# Patient Record
Sex: MALE | Race: OTHER | HISPANIC OR LATINO
[De-identification: names, ages, dates, MRNs, and addresses within clinical notes are randomized per-mention and may not be internally consistent; named-entity substitution may affect disease eponyms.]

---

## 2017-08-02 RX ORDER — AZITHROMYCIN 500 MG/1
0 TABLET, FILM COATED ORAL
Qty: 6 | Refills: 0 | COMMUNITY
Start: 2017-08-02

## 2017-08-03 ENCOUNTER — TRANSCRIPTION ENCOUNTER (OUTPATIENT)
Age: 37
End: 2017-08-03

## 2017-08-03 ENCOUNTER — INPATIENT (INPATIENT)
Facility: HOSPITAL | Age: 37
LOS: 0 days | Discharge: HOME | DRG: 203 | End: 2017-08-04
Attending: HOSPITALIST | Admitting: INTERNAL MEDICINE
Payer: MEDICAID

## 2017-08-03 VITALS
HEART RATE: 96 BPM | OXYGEN SATURATION: 95 % | TEMPERATURE: 99 F | RESPIRATION RATE: 28 BRPM | DIASTOLIC BLOOD PRESSURE: 67 MMHG | SYSTOLIC BLOOD PRESSURE: 132 MMHG

## 2017-08-03 DIAGNOSIS — Z98.89 OTHER SPECIFIED POSTPROCEDURAL STATES: Chronic | ICD-10-CM

## 2017-08-03 DIAGNOSIS — J45.909 UNSPECIFIED ASTHMA, UNCOMPLICATED: ICD-10-CM

## 2017-08-03 LAB
ALBUMIN SERPL ELPH-MCNC: 4.7 G/DL — SIGNIFICANT CHANGE UP (ref 3.3–5)
ALP SERPL-CCNC: 83 U/L — SIGNIFICANT CHANGE UP (ref 40–120)
ALT FLD-CCNC: 67 U/L RC — HIGH (ref 10–45)
ANION GAP SERPL CALC-SCNC: 13 MMOL/L — SIGNIFICANT CHANGE UP (ref 5–17)
AST SERPL-CCNC: 114 U/L — HIGH (ref 10–40)
BASOPHILS # BLD AUTO: 0 K/UL — SIGNIFICANT CHANGE UP (ref 0–0.2)
BASOPHILS NFR BLD AUTO: 0.1 % — SIGNIFICANT CHANGE UP (ref 0–2)
BILIRUB SERPL-MCNC: 0.5 MG/DL — SIGNIFICANT CHANGE UP (ref 0.2–1.2)
BUN SERPL-MCNC: 12 MG/DL — SIGNIFICANT CHANGE UP (ref 7–23)
CALCIUM SERPL-MCNC: 9.3 MG/DL — SIGNIFICANT CHANGE UP (ref 8.4–10.5)
CHLORIDE SERPL-SCNC: 103 MMOL/L — SIGNIFICANT CHANGE UP (ref 96–108)
CO2 SERPL-SCNC: 25 MMOL/L — SIGNIFICANT CHANGE UP (ref 22–31)
CREAT SERPL-MCNC: 0.75 MG/DL — SIGNIFICANT CHANGE UP (ref 0.5–1.3)
EOSINOPHIL # BLD AUTO: 0.1 K/UL — SIGNIFICANT CHANGE UP (ref 0–0.5)
EOSINOPHIL NFR BLD AUTO: 0.8 % — SIGNIFICANT CHANGE UP (ref 0–6)
GAS PNL BLDV: SIGNIFICANT CHANGE UP
GLUCOSE SERPL-MCNC: 117 MG/DL — HIGH (ref 70–99)
HCT VFR BLD CALC: 43.2 % — SIGNIFICANT CHANGE UP (ref 39–50)
HGB BLD-MCNC: 14.8 G/DL — SIGNIFICANT CHANGE UP (ref 13–17)
LYMPHOCYTES # BLD AUTO: 0.7 K/UL — LOW (ref 1–3.3)
LYMPHOCYTES # BLD AUTO: 4.9 % — LOW (ref 13–44)
MAGNESIUM SERPL-MCNC: 1.8 MG/DL — SIGNIFICANT CHANGE UP (ref 1.6–2.6)
MCHC RBC-ENTMCNC: 30.8 PG — SIGNIFICANT CHANGE UP (ref 27–34)
MCHC RBC-ENTMCNC: 34.2 GM/DL — SIGNIFICANT CHANGE UP (ref 32–36)
MCV RBC AUTO: 90.2 FL — SIGNIFICANT CHANGE UP (ref 80–100)
MONOCYTES # BLD AUTO: 0.4 K/UL — SIGNIFICANT CHANGE UP (ref 0–0.9)
MONOCYTES NFR BLD AUTO: 2.4 % — SIGNIFICANT CHANGE UP (ref 2–14)
NEUTROPHILS # BLD AUTO: 13.4 K/UL — HIGH (ref 1.8–7.4)
NEUTROPHILS NFR BLD AUTO: 91.7 % — HIGH (ref 43–77)
PLATELET # BLD AUTO: 280 K/UL — SIGNIFICANT CHANGE UP (ref 150–400)
POTASSIUM SERPL-MCNC: 4.2 MMOL/L — SIGNIFICANT CHANGE UP (ref 3.5–5.3)
POTASSIUM SERPL-SCNC: 4.2 MMOL/L — SIGNIFICANT CHANGE UP (ref 3.5–5.3)
PROT SERPL-MCNC: 8.2 G/DL — SIGNIFICANT CHANGE UP (ref 6–8.3)
RAPID RVP RESULT: DETECTED
RBC # BLD: 4.79 M/UL — SIGNIFICANT CHANGE UP (ref 4.2–5.8)
RBC # FLD: 11.4 % — SIGNIFICANT CHANGE UP (ref 10.3–14.5)
RV+EV RNA SPEC QL NAA+PROBE: DETECTED
SODIUM SERPL-SCNC: 141 MMOL/L — SIGNIFICANT CHANGE UP (ref 135–145)
WBC # BLD: 14.6 K/UL — HIGH (ref 3.8–10.5)
WBC # FLD AUTO: 14.6 K/UL — HIGH (ref 3.8–10.5)

## 2017-08-03 PROCEDURE — 71010: CPT | Mod: 26

## 2017-08-03 PROCEDURE — 99285 EMERGENCY DEPT VISIT HI MDM: CPT

## 2017-08-03 RX ORDER — IPRATROPIUM/ALBUTEROL SULFATE 18-103MCG
3 AEROSOL WITH ADAPTER (GRAM) INHALATION ONCE
Qty: 0 | Refills: 0 | Status: COMPLETED | OUTPATIENT
Start: 2017-08-03 | End: 2017-08-03

## 2017-08-03 RX ORDER — MAGNESIUM SULFATE 500 MG/ML
2 VIAL (ML) INJECTION ONCE
Qty: 0 | Refills: 0 | Status: COMPLETED | OUTPATIENT
Start: 2017-08-03 | End: 2017-08-03

## 2017-08-03 RX ORDER — SODIUM CHLORIDE 9 MG/ML
1000 INJECTION INTRAMUSCULAR; INTRAVENOUS; SUBCUTANEOUS ONCE
Qty: 0 | Refills: 0 | Status: COMPLETED | OUTPATIENT
Start: 2017-08-03 | End: 2017-08-03

## 2017-08-03 RX ORDER — IPRATROPIUM/ALBUTEROL SULFATE 18-103MCG
0 AEROSOL WITH ADAPTER (GRAM) INHALATION
Qty: 90 | Refills: 0 | COMMUNITY
Start: 2017-08-03

## 2017-08-03 RX ORDER — IPRATROPIUM/ALBUTEROL SULFATE 18-103MCG
3 AEROSOL WITH ADAPTER (GRAM) INHALATION EVERY 4 HOURS
Qty: 0 | Refills: 0 | Status: DISCONTINUED | OUTPATIENT
Start: 2017-08-03 | End: 2017-08-04

## 2017-08-03 RX ORDER — BROMPHENIRAMINE MALEATE, PSEUDOEPHEDRINE HYDROCHLORIDE, AND DEXTROMETHORPHAN HYDROBROMIDE 2; 10; 30 MG/5ML; MG/5ML; MG/5ML
0 SOLUTION ORAL
Qty: 200 | Refills: 0 | COMMUNITY
Start: 2017-08-03

## 2017-08-03 RX ADMIN — Medication 3 MILLILITER(S): at 20:09

## 2017-08-03 RX ADMIN — Medication 50 GRAM(S): at 20:09

## 2017-08-03 RX ADMIN — SODIUM CHLORIDE 1000 MILLILITER(S): 9 INJECTION INTRAMUSCULAR; INTRAVENOUS; SUBCUTANEOUS at 20:10

## 2017-08-03 RX ADMIN — Medication 3 MILLILITER(S): at 22:00

## 2017-08-03 RX ADMIN — Medication 125 MILLIGRAM(S): at 20:09

## 2017-08-03 NOTE — ED PROVIDER NOTE - PHYSICAL EXAMINATION
Jean: A & O x 3, speaking in 2 word sentences, in respiratory distress, HEENT WNL and no facial asymmetry; lungs tight with poor air movement, heart with reg rhythm; abdomen soft obese NTND; extremities with no edema; skin with no rashes, neuro exam non focal with no motor or sensory deficits

## 2017-08-03 NOTE — ED PROVIDER NOTE - OBJECTIVE STATEMENT
36 year old asthmatic, never intubated but states he was very close in the past, presenting after days of worsening symptoms, on day to of azithromycin, prendisone, nebs 36 year old asthmatic, never intubated but states he was very close in the past, presenting after days of worsening symptoms, on day to of azithromycin, prendisone, nebs.     PMD: Dylan Zavala ([facility])

## 2017-08-03 NOTE — ED ADULT NURSE NOTE - OBJECTIVE STATEMENT
Recvd pt in respiratory distress.  pt was tachypneic and diaphoretic; unable to speak more than 2 words sentences at a time.  wheezes ascultated in all lung fields while pt is tripoding to breathe.  pt immediately given duoneb tx and 18 g heplock inserted.  iv meds administered, as per md's orders.  bipap tx initiated, as per md's orders with settings ps:12/5 fio2=40%.  pt  now breathing more comfortably.  no diaphoresis or tachypnea noted.  pt is awake, alert and responsive to all stimuli.  pt denies any pain at this time.  pt resting in bed with cardiac monitor in place in ST awaiting md reeval.  will continue to monitor.

## 2017-08-03 NOTE — ED ADULT TRIAGE NOTE - CHIEF COMPLAINT QUOTE
Asthma attack, sent by urgent care for further treatment, given Duoneb, oral prednisone and Zithromax  Last time in ED received Solumedrol and Mag

## 2017-08-03 NOTE — ED PROVIDER NOTE - MEDICAL DECISION MAKING DETAILS
Asthmatic with history of near intubation in the past, presenting with respiratory distress refractory to home prednisone, nebs, azithromycin. Bipap started immediately with iniatition of magnesium, solumedrol, duonebs. Will require at least observation in CDU if he resolves or admission for continued treatment. Asthmatic with history of near intubation in the past, presenting with respiratory distress refractory to home prednisone, nebs, azithromycin. Bipap started immediately with iniatition of magnesium, solumedrol, duonebs. Will require at least observation in CDU if he resolves or admission for continued treatment ZR

## 2017-08-04 ENCOUNTER — TRANSCRIPTION ENCOUNTER (OUTPATIENT)
Age: 37
End: 2017-08-04

## 2017-08-04 VITALS
SYSTOLIC BLOOD PRESSURE: 130 MMHG | OXYGEN SATURATION: 95 % | DIASTOLIC BLOOD PRESSURE: 63 MMHG | TEMPERATURE: 98 F | HEART RATE: 107 BPM | RESPIRATION RATE: 16 BRPM

## 2017-08-04 DIAGNOSIS — R74.0 NONSPECIFIC ELEVATION OF LEVELS OF TRANSAMINASE AND LACTIC ACID DEHYDROGENASE [LDH]: ICD-10-CM

## 2017-08-04 DIAGNOSIS — B34.1 ENTEROVIRUS INFECTION, UNSPECIFIED: ICD-10-CM

## 2017-08-04 DIAGNOSIS — J45.901 UNSPECIFIED ASTHMA WITH (ACUTE) EXACERBATION: ICD-10-CM

## 2017-08-04 DIAGNOSIS — Z29.9 ENCOUNTER FOR PROPHYLACTIC MEASURES, UNSPECIFIED: ICD-10-CM

## 2017-08-04 LAB — GAS PNL BLDA: SIGNIFICANT CHANGE UP

## 2017-08-04 PROCEDURE — 80053 COMPREHEN METABOLIC PANEL: CPT

## 2017-08-04 PROCEDURE — 85027 COMPLETE CBC AUTOMATED: CPT

## 2017-08-04 PROCEDURE — 99223 1ST HOSP IP/OBS HIGH 75: CPT | Mod: GC

## 2017-08-04 PROCEDURE — 36600 WITHDRAWAL OF ARTERIAL BLOOD: CPT

## 2017-08-04 PROCEDURE — 87798 DETECT AGENT NOS DNA AMP: CPT

## 2017-08-04 PROCEDURE — 96374 THER/PROPH/DIAG INJ IV PUSH: CPT

## 2017-08-04 PROCEDURE — 84132 ASSAY OF SERUM POTASSIUM: CPT

## 2017-08-04 PROCEDURE — 83605 ASSAY OF LACTIC ACID: CPT

## 2017-08-04 PROCEDURE — 82803 BLOOD GASES ANY COMBINATION: CPT

## 2017-08-04 PROCEDURE — 99239 HOSP IP/OBS DSCHRG MGMT >30: CPT

## 2017-08-04 PROCEDURE — 82330 ASSAY OF CALCIUM: CPT

## 2017-08-04 PROCEDURE — 82435 ASSAY OF BLOOD CHLORIDE: CPT

## 2017-08-04 PROCEDURE — 71045 X-RAY EXAM CHEST 1 VIEW: CPT

## 2017-08-04 PROCEDURE — 83735 ASSAY OF MAGNESIUM: CPT

## 2017-08-04 PROCEDURE — 87581 M.PNEUMON DNA AMP PROBE: CPT

## 2017-08-04 PROCEDURE — 94640 AIRWAY INHALATION TREATMENT: CPT

## 2017-08-04 PROCEDURE — 93005 ELECTROCARDIOGRAM TRACING: CPT

## 2017-08-04 PROCEDURE — 99285 EMERGENCY DEPT VISIT HI MDM: CPT | Mod: 25

## 2017-08-04 PROCEDURE — 85014 HEMATOCRIT: CPT

## 2017-08-04 PROCEDURE — 87633 RESP VIRUS 12-25 TARGETS: CPT

## 2017-08-04 PROCEDURE — 94660 CPAP INITIATION&MGMT: CPT

## 2017-08-04 PROCEDURE — 82947 ASSAY GLUCOSE BLOOD QUANT: CPT

## 2017-08-04 PROCEDURE — 87486 CHLMYD PNEUM DNA AMP PROBE: CPT

## 2017-08-04 PROCEDURE — 96375 TX/PRO/DX INJ NEW DRUG ADDON: CPT

## 2017-08-04 PROCEDURE — 84295 ASSAY OF SERUM SODIUM: CPT

## 2017-08-04 RX ORDER — IPRATROPIUM/ALBUTEROL SULFATE 18-103MCG
3 AEROSOL WITH ADAPTER (GRAM) INHALATION
Qty: 0 | Refills: 0 | COMMUNITY
Start: 2017-08-04

## 2017-08-04 RX ORDER — ACETAMINOPHEN 500 MG
650 TABLET ORAL ONCE
Qty: 0 | Refills: 0 | Status: COMPLETED | OUTPATIENT
Start: 2017-08-04 | End: 2017-08-04

## 2017-08-04 RX ORDER — IPRATROPIUM/ALBUTEROL SULFATE 18-103MCG
3 AEROSOL WITH ADAPTER (GRAM) INHALATION
Qty: 1 | Refills: 0 | OUTPATIENT
Start: 2017-08-04 | End: 2017-08-11

## 2017-08-04 RX ORDER — IPRATROPIUM/ALBUTEROL SULFATE 18-103MCG
3 AEROSOL WITH ADAPTER (GRAM) INHALATION
Qty: 0 | Refills: 0 | DISCHARGE
Start: 2017-08-04

## 2017-08-04 RX ORDER — ALBUTEROL 90 UG/1
2 AEROSOL, METERED ORAL
Qty: 0 | Refills: 0 | DISCHARGE
Start: 2017-08-04

## 2017-08-04 RX ORDER — GUAIFENESIN/DEXTROMETHORPHAN 600MG-30MG
5 TABLET, EXTENDED RELEASE 12 HR ORAL
Qty: 100 | Refills: 0 | OUTPATIENT
Start: 2017-08-04 | End: 2017-08-09

## 2017-08-04 RX ORDER — ALBUTEROL 90 UG/1
2 AEROSOL, METERED ORAL
Qty: 1 | Refills: 0
Start: 2017-08-04

## 2017-08-04 RX ORDER — GUAIFENESIN/DEXTROMETHORPHAN 600MG-30MG
5 TABLET, EXTENDED RELEASE 12 HR ORAL
Qty: 100 | Refills: 0
Start: 2017-08-04 | End: 2017-08-09

## 2017-08-04 RX ORDER — ALBUTEROL 90 UG/1
2 AEROSOL, METERED ORAL
Qty: 1 | Refills: 0 | OUTPATIENT
Start: 2017-08-04

## 2017-08-04 RX ORDER — ALBUTEROL 90 UG/1
2 AEROSOL, METERED ORAL
Qty: 0 | Refills: 0 | COMMUNITY

## 2017-08-04 RX ADMIN — Medication 650 MILLIGRAM(S): at 13:06

## 2017-08-04 RX ADMIN — Medication 3 MILLILITER(S): at 02:09

## 2017-08-04 RX ADMIN — Medication 3 MILLILITER(S): at 15:59

## 2017-08-04 RX ADMIN — Medication 100 MILLIGRAM(S): at 05:41

## 2017-08-04 RX ADMIN — Medication 50 MILLIGRAM(S): at 06:46

## 2017-08-04 RX ADMIN — Medication 650 MILLIGRAM(S): at 14:00

## 2017-08-04 RX ADMIN — Medication 3 MILLILITER(S): at 05:41

## 2017-08-04 RX ADMIN — Medication 3 MILLILITER(S): at 12:02

## 2017-08-04 RX ADMIN — Medication 200 MILLIGRAM(S): at 16:00

## 2017-08-04 NOTE — DISCHARGE NOTE ADULT - CARE PLAN
Principal Discharge DX:	Asthma exacerbation  Goal:	Take your medications, follow up with your doctor  Instructions for follow-up, activity and diet:	You were admitted to the hospital for an asthma exacerbation likely secondary to a viral upper respiratory tract infection. You were treated with nebulizers and high dose steroids. You improved clinically and now will be discharged home on 3 additional days of prednisone, a rescue inhaler, nebulizers. We recommend you follow up with your primary care doctor within the week. If you experience difficulty breathing, chest pain, worsening symptoms please call your doctor or return to the emergency room.  Secondary Diagnosis:	Enterovirus infection  Goal:	symptom management  Instructions for follow-up, activity and diet:	Supportive care, wash your hands, maintain good hygiene. Recommending rest and take in plenty of fluids. We have sent you a cough suppressant to your pharmacy.

## 2017-08-04 NOTE — DISCHARGE NOTE ADULT - MEDICATION SUMMARY - MEDICATIONS TO STOP TAKING
I will STOP taking the medications listed below when I get home from the hospital:    albuterol  --  by mouth    Stiolto Respimat  --  inhaled    predniSONE 50 mg oral tablet  -- 1 tab(s) by mouth once a day  -- It is very important that you take or use this exactly as directed.  Do not skip doses or discontinue unless directed by your doctor.  Obtain medical advice before taking any non-prescription drugs as some may affect the action of this medication.  Take with food or milk.    Hydromet 5 mg-1.5 mg/5 mL oral syrup  -- 5 milliliter(s) by mouth every 8 hours, As Needed - for cough  -- Caution federal law prohibits the transfer of this drug to any person other  than the person for whom it was prescribed.  May cause drowsiness.  Alcohol may intensify this effect.  Use care when operating dangerous machinery.  Obtain medical advice before taking any non-prescription drugs as some may affect the action of this medication.    clarithromycin 250 mg oral tablet  -- 2 tab(s) by mouth 2 times a day  -- Finish all this medication unless otherwise directed by prescriber.    Xopenex HFA 45 mcg/inh inhalation aerosol  -- 1 puff(s) inhaled every 4 hours PRN wheezing and cough  -- For inhalation only.  Shake well before use.    Hydromet 1.5 mg-5 mg/5 mL oral syrup  -- 5 milliliter(s) by mouth every 8 hours, As Needed MDD:15ml  -- Caution federal law prohibits the transfer of this drug to any person other  than the person for whom it was prescribed.  May cause drowsiness.  Alcohol may intensify this effect.  Use care when operating dangerous machinery.  Obtain medical advice before taking any non-prescription drugs as some may affect the action of this medication.    predniSONE 50 mg oral tablet  -- 1 tab(s) by mouth once a day  -- It is very important that you take or use this exactly as directed.  Do not skip doses or discontinue unless directed by your doctor.  Obtain medical advice before taking any non-prescription drugs as some may affect the action of this medication.  Take with food or milk.    Xopenex HFA CFC free 45 mcg/inh inhalation aerosol  -- 2 puff(s) inhaled every 4 hours, As Needed  -- For inhalation only.  Shake well before use.    benzonatate 200 mg oral capsule  -- 1 cap(s) by mouth 3 times a day  -- May cause drowsiness.  Alcohol may intensify this effect.  Use care when operating dangerous machinery.  Swallow whole.  Do not crush.    Xopenex Concentrate 1.25 mg/0.5 mL inhalation solution  -- 0.5 milliliter(s) inhaled 3 times a day  -- For inhalation only.  It is very important that you take or use this exactly as directed.  Do not skip doses or discontinue unless directed by your doctor.  Obtain medical advice before taking any non-prescription drugs as some may affect the action of this medication.    predniSONE 50 mg oral tablet  -- 1 tab(s) by mouth once a day    albuterol-ipratropium 2.5 mg-0.5 mg/3 mL inhalation solution  -- 3 milliliter(s) inhaled every 4 hours

## 2017-08-04 NOTE — H&P ADULT - PROBLEM SELECTOR PLAN 1
Wheezing on admission with Leukocytosis (received Prednisone outpatient x one day). Improved IV solumedrol   Duonebs prn. No need for antibiotics Wheezing on admission with Leukocytosis (received Prednisone outpatient x one day). Improved with IV solumedrol  c/w Prednisone 50 mg qd for now    Duonebs prn. No need for antibiotics Wheezing on admission with Leukocytosis (received Prednisone outpatient x one day). Improved with IV solumedrol  c/w Prednisone 50 mg qd for now    Duonebs   No need for antibiotics as CXR with no notable consolidations at this time.

## 2017-08-04 NOTE — H&P ADULT - PROBLEM SELECTOR PLAN 3
DVT ppx: Lovenox Likely in setting of acute Viral URI  Trend Hepatic Panel  F/U with outpatient provider

## 2017-08-04 NOTE — H&P ADULT - ATTENDING COMMENTS
Pleasant 35 yo man with PMH of asthma presenting with shortness of breath. Patient states SOB acutely worsened in the past day with associated wheezing. [facility] and Was given Prednisone, Zithromax and Albuterol/Ipratropium nebulizer which made him feel somewhat improved after an hour and worsened today. Patient was then advised by urgent came for further treatment. Patient states that his fiancé was a sick contact. Endorses cough in the past few days with productive quality and yellow green sputum prior to worsening symptoms. Patient states that he has not been on maintenance medication for asthma since January as his symptoms typically exacerbate in setting of URI. He typically utilizes an albuterol inhaler as needed. Patient s/p Duoneb, Solumedrol and Magnesium and BiPAP in the ED and currently states he feels improved.  Vitals reviewed and physically examined patient. Agree with resident's findings: Pertinent findings: Patient is sitting in bed, no apparent distress. Speaking in full sentences. Pulm: no labored breathing, no use of accessory muscles, good air movement b/l. no appreciable wheeze at this time. no rales nor ronchi. CV: RRR + S1 S2 no appreciable murmur. Skin warm and dry.  Labs, imaging and EKG personally reviewed EKG.  Asthma exacerbation: patient with improvement of symptoms. Continue with ATC nebulizer, Steroids, antitussives prn. Supplemental O2 prn  Primary day team to eval resp status during day in setting of recent ABG as SpO2 91.   Remainder of plan as annotated above.  Patient previously unknown to me and I was assigned to case with resident. Primary day team to continue care in AM. Pleasant 35 yo man with PMH of asthma presenting with shortness of breath. Patient states SOB acutely worsened in the past day with associated wheezing. Patient [facility] and Was given Prednisone, Zithromax and Albuterol/Ipratropium nebulizer which made him feel somewhat improved after an hour and worsened today. Patient was then advised by urgent came for further treatment. Patient states that his fiancé was a sick contact. Endorses cough in the past few days with productive quality and yellow green sputum prior to worsening symptoms. Patient states that he has not been on maintenance medication for asthma since January as his symptoms typically exacerbate in setting of URI. He typically utilizes an albuterol inhaler as needed. Patient s/p Duoneb, Solumedrol and Magnesium and BiPAP in the ED and currently states he feels improved.  Vitals reviewed and physically examined patient. Agree with resident's findings: Pertinent findings: Patient is sitting in bed, no apparent distress. Speaking in full sentences. Pulm: no labored breathing, no use of accessory muscles, good air movement b/l. no appreciable wheeze at this time. no rales nor ronchi. CV: RRR + S1 S2 no appreciable murmur. Skin warm and dry.  Labs, imaging and EKG personally reviewed EKG.  Asthma exacerbation: patient with improvement of symptoms. Continue with ATC nebulizer, Steroids, antitussives prn. Supplemental O2 prn  Primary day team to eval resp status during day in setting of recent ABG as SpO2 91. Trend lactate  Remainder of plan as annotated above.  Patient previously unknown to me and I was assigned to case with resident. Primary day team to continue care in AM.

## 2017-08-04 NOTE — H&P ADULT - NSHPREVIEWOFSYSTEMS_GEN_ALL_CORE
Review of Systems: GEN: no fevers, Positive chills, no night sweats no weight loss , no swollen lymph nodes    EYES: No blurry vision , no changes in vision  ENT: no sores, no runny nose, no sore throat   PULM: as per HPI   CARD: no chest pain, no palpitations    GI : no abdominal pain , no nausea, no vomiting  : no burning urination, no change in color of urine, no flank pain, no blood in urine   MSK: no swelling   SKIN: no bruising or bleeding, no sores in mouth  , no yellowing of the skin  Neuro: no lightheadedness, no headaches, no weakness, no fainting, no confusion , no seizures

## 2017-08-04 NOTE — DISCHARGE NOTE ADULT - HOSPITAL COURSE
md 37 yo man with PMH of asthma presenting with shortness of breath. Patient stateed SOB acutely worsened in the past day with associated wheezing. Patient [facility] and Was given Prednisone, Zithromax and Albuterol/Ipratropium nebulizer which made him feel somewhat improved after an hour but worsened subsequently. Patient was then advised by urgent came for further treatment. admitted for asthma exacerbation. Treated with Duonebs, Solumedrol and Magnesium and BiPAP in the ED and subsequently improved.   Found to be enterovirus positive. Likely viral URI causing the asthma exacerbation.  On the floor pt was treated with ATC nebulizer, Steroids, antitussives prn. Supplemental O2 prn    He subsequently improved, was medically stable for discharge home with outpatient follow up. He was discharged with 3 days of oral prednisone, albuterol hfa, nebulizers for home

## 2017-08-04 NOTE — H&P ADULT - HISTORY OF PRESENT ILLNESS
Patient is a 36 years old male with PMH of mild intermittent Asthma (never intubated) who presents with SOB of one day in duration. Patient says that his fiance has the Flu and he developed a productive cough with body aches a day ago. His cough was productive of yellowish sputum. He also endorsed having chills and anorexia but denied chest pain, palpitations, fever, nausea, vomiting, sore throat, abdominal pain, diarrhea, rash, or urinary complaints. He took a codeine based cough suppressant which helped alleviate his symptoms to a certain extent. He used his albuterol without significant relief.     In the ED, patient was hemodynamically stable but tachypneic. Placed on BiPAP, given IV solumedrol and admitted to medicine. Patient is a 36 years old male with PMH of mild intermittent Asthma (never intubated) who presents with SOB of one day in duration. Patient says that his fiance has the Flu and he developed a productive cough with body aches a day ago. His cough was productive of yellowish sputum. He also endorsed having chills and anorexia but denied chest pain, palpitations, fever, nausea, vomiting, sore throat, abdominal pain, diarrhea, rash, or urinary complaints. He took a codeine based cough suppressant which helped alleviate his symptoms to a certain extent. He used his albuterol without significant relief.     In the ED, patient was hemodynamically stable but tachypneic. Placed on BiPAP, given IV solumedrol, Duonebs, IV Mg and admitted to medicine. Patient is a 36 years old male with PMH of mild intermittent Asthma (never intubated) who presents with SOB of one day in duration. Patient says that his fiance has the Flu and he developed a productive cough with body aches a day ago. His cough was productive of yellowish sputum. He also endorsed having chills and anorexia but denied chest pain, palpitations, fever, nausea, vomiting, sore throat, abdominal pain, diarrhea, rash, or urinary complaints. He took a codeine based cough suppressant which helped alleviate his symptoms to a certain extent. He used his albuterol without significant relief. He sought medical attention from his PMD who prescribed Prednisone 50 mg qd and Z pack. Patient took 2 days of z pack and one day of prednisone without significant relief.     In the ED, patient was hemodynamically stable but tachypneic. Placed on BiPAP, given IV solumedrol, Duonebs, IV Mg and admitted to medicine.

## 2017-08-04 NOTE — CHART NOTE - NSCHARTNOTEFT_GEN_A_CORE
hospitalist brief note. See H&P dated today for full details.   Pt seen and examined at bedside with NP Lia  35 yo M with mild intermittent asthma p/w asthma exacerbation 2/2 entero/rhinovirus. Clinically improved today, off NC with 95% saturation on RA  cont supportive care for viral URI - cough supressants, nasal decongestant as needed  for asthma- cont duonebs, prednisone.   pt doing clinically well, if continues to improve  suspect discharge later today vs tomorrow   will follow up    pager [phone]

## 2017-08-04 NOTE — DISCHARGE NOTE ADULT - MEDICATION SUMMARY - MEDICATIONS TO TAKE
I will START or STAY ON the medications listed below when I get home from the hospital:    predniSONE 50 mg oral tablet  -- 1 tab(s) by mouth once a day  -- Indication: For Asthma    albuterol-ipratropium 2.5 mg-0.5 mg/3 mL inhalation solution  -- 3 milliliter(s) inhaled every 4 hours  -- Indication: For Asthma    Ventolin HFA 90 mcg/inh inhalation aerosol  -- 2 puff(s) inhaled 4 times a day, As Needed  -- Indication: For Asthma    guaifenesin-dextromethorphan 100mg-10mg/5 mL oral liquid  -- 5 milliliter(s) by mouth every 6 hours  -- Medication should be taken with plenty of water.    -- Indication: For Enterovirus infection

## 2017-08-04 NOTE — H&P ADULT - NSHPPHYSICALEXAM_GEN_ALL_CORE
Vital Signs Last 24 Hrs  T(C): 37.2 (04 Aug 2017 01:27), Max: 37.4 (03 Aug 2017 19:44)  T(F): 98.9 (04 Aug 2017 01:27), Max: 99.3 (03 Aug 2017 19:44)  HR: 89 (04 Aug 2017 01:27) (84 - 98)  BP: 141/79 (04 Aug 2017 01:27) (132/67 - 146/90)  BP(mean): --  RR: 18 (04 Aug 2017 01:27) (18 - 28)  SpO2: 96% (04 Aug 2017 01:27) (95% - 99%)    PHYSICAL EXAM:  GENERAL: NAD, well-developed  HEAD:  Atraumatic, Normocephalic  EYES: EOMI, PERRLA, conjunctiva and sclera clear  NECK: Supple, No JVD  CHEST/LUNG: Clear to auscultation bilaterally; No wheeze  HEART: Regular rate and rhythm; No murmurs, rubs, or gallops  ABDOMEN: Soft, Nontender, Nondistended; Bowel sounds present  EXTREMITIES:  2+ Peripheral Pulses, No clubbing, cyanosis, or edema  PSYCH: AAOx3  NEUROLOGY: non-focal  SKIN: No rashes or lesions

## 2017-08-04 NOTE — H&P ADULT - ASSESSMENT
Patient is a 36 years old male with PMH of mild intermittent Asthma (never intubated) who presents with SOB of one day in duration

## 2017-08-04 NOTE — DISCHARGE NOTE ADULT - PLAN OF CARE
Take your medications, follow up with your doctor You were admitted to the hospital for an asthma exacerbation likely secondary to a viral upper respiratory tract infection. You were treated with nebulizers and high dose steroids. You improved clinically and now will be discharged home on 3 additional days of prednisone, a rescue inhaler, nebulizers. We recommend you follow up with your primary care doctor within the week. If you experience difficulty breathing, chest pain, worsening symptoms please call your doctor or return to the emergency room. symptom management Supportive care, wash your hands, maintain good hygiene. Recommending rest and take in plenty of fluids. We have sent you a cough suppressant to your pharmacy.

## 2017-08-04 NOTE — H&P ADULT - NSHPLABSRESULTS_GEN_ALL_CORE
14.8   14.6  )-----------( 280      ( 03 Aug 2017 20:28 )             43.2       08-03    141  |  103  |  12  ----------------------------<  117<H>  4.2   |  25  |  0.75    Ca    9.3      03 Aug 2017 20:28  Mg     1.8     08-03    TPro  8.2  /  Alb  4.7  /  TBili  0.5  /  DBili  x   /  AST  114<H>  /  ALT  67<H>  /  AlkPhos  83  08-03    RVP: + for entero/Rhinovirus     CXR: No focal consolidation noted. No pleural effusions. Personally reviewed labs and noted in detail below               14.8   14.6  )-----------( 280      ( 03 Aug 2017 20:28 )             43.2     08-03    141  |  103  |  12  ----------------------------<  117<H>  4.2   |  25  |  0.75    Ca    9.3      03 Aug 2017 20:28  Mg     1.8     08-03    TPro  8.2  /  Alb  4.7  /  TBili  0.5  /  DBili  x   /  AST  114<H>  /  ALT  67<H>  /  AlkPhos  83  08-03     ABG - ( 04 Aug 2017 05:20 )  pH: 7.41  /  pCO2: 40    /  pO2: 58    / HCO3: 25    / Base Excess: .5    /  SaO2: 91          RVP: + for entero/Rhinovirus     Personally reviewed CXR: No focal consolidation noted. No pleural effusions.    Personally reviewed EKG:  SR 97 bpm  QTc 424 no appreciable ST segment changes

## 2017-08-04 NOTE — H&P ADULT - NSHPSOCIALHISTORY_GEN_ALL_CORE
Occasional smoker. Social drinking   Independent with all ADLs Engaged:  Occasional smoker.   Social drinking   Independent with all ADLs

## 2017-08-04 NOTE — H&P ADULT - PROBLEM SELECTOR PLAN 2
In setting of acute Viral URI  Will need repeat LFTs in a month Supportive measure  Antipyretics prn   Antitussives prn

## 2018-02-07 ENCOUNTER — TRANSCRIPTION ENCOUNTER (OUTPATIENT)
Age: 38
End: 2018-02-07

## 2018-04-01 ENCOUNTER — OUTPATIENT (OUTPATIENT)
Dept: OUTPATIENT SERVICES | Facility: HOSPITAL | Age: 38
LOS: 1 days | End: 2018-04-01
Payer: MEDICAID

## 2018-04-01 DIAGNOSIS — Z98.89 OTHER SPECIFIED POSTPROCEDURAL STATES: Chronic | ICD-10-CM

## 2018-04-01 PROCEDURE — G9001: CPT

## 2018-04-17 DIAGNOSIS — R69 ILLNESS, UNSPECIFIED: ICD-10-CM

## 2018-05-06 ENCOUNTER — TRANSCRIPTION ENCOUNTER (OUTPATIENT)
Age: 38
End: 2018-05-06

## 2018-07-02 ENCOUNTER — EMERGENCY (EMERGENCY)
Facility: HOSPITAL | Age: 38
LOS: 1 days | Discharge: HOME | End: 2018-07-02
Attending: EMERGENCY MEDICINE
Payer: MEDICAID

## 2018-07-02 VITALS
SYSTOLIC BLOOD PRESSURE: 138 MMHG | DIASTOLIC BLOOD PRESSURE: 87 MMHG | WEIGHT: 250 LBS | HEART RATE: 71 BPM | RESPIRATION RATE: 18 BRPM | TEMPERATURE: 98 F | OXYGEN SATURATION: 96 % | HEIGHT: 68.5 IN

## 2018-07-02 VITALS
RESPIRATION RATE: 18 BRPM | OXYGEN SATURATION: 96 % | HEART RATE: 66 BPM | TEMPERATURE: 98 F | SYSTOLIC BLOOD PRESSURE: 136 MMHG

## 2018-07-02 DIAGNOSIS — Z98.89 OTHER SPECIFIED POSTPROCEDURAL STATES: Chronic | ICD-10-CM

## 2018-07-02 PROCEDURE — 73630 X-RAY EXAM OF FOOT: CPT

## 2018-07-02 PROCEDURE — 99283 EMERGENCY DEPT VISIT LOW MDM: CPT

## 2018-07-02 PROCEDURE — 73630 X-RAY EXAM OF FOOT: CPT | Mod: 26,LT

## 2018-07-02 RX ORDER — IBUPROFEN 200 MG
600 TABLET ORAL ONCE
Qty: 0 | Refills: 0 | Status: COMPLETED | OUTPATIENT
Start: 2018-07-02 | End: 2018-07-02

## 2018-07-02 RX ORDER — ACETAMINOPHEN 500 MG
650 TABLET ORAL EVERY 6 HOURS
Qty: 0 | Refills: 0 | Status: DISCONTINUED | OUTPATIENT
Start: 2018-07-02 | End: 2018-07-06

## 2018-07-02 RX ADMIN — Medication 600 MILLIGRAM(S): at 10:00

## 2018-07-02 RX ADMIN — Medication 650 MILLIGRAM(S): at 09:07

## 2018-07-02 RX ADMIN — Medication 600 MILLIGRAM(S): at 09:08

## 2018-07-02 RX ADMIN — Medication 650 MILLIGRAM(S): at 10:00

## 2018-07-02 NOTE — ED ADULT NURSE NOTE - OBJECTIVE STATEMENT
Pt is a 36 yo M who came to the ED amb c/o left foot pain. Pain was sudden onset this morning, sharp, in the arch of his foot, denies injury, is able to ambulate but has to limp. A/O x3, NAD, foot appears intact, no swelling, bruising, deformity, + pulses, dec ROM due to pain.

## 2018-07-02 NOTE — ED PROVIDER NOTE - OBJECTIVE STATEMENT
Attending India Garcia: 38 y/o previously healthy male presenting with left foot pain for the last few days. 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 reports increased pain to the bottom of mid foot for last few days. no falls or trauma. pain worsens when tries to dance on it or move his big toe. no h/o similar. no falls. has not tried taking anything for the pain. no pain to the bottom heel. able to walk but does have pain. no pain to the ankle. no h/o gout

## 2018-07-02 NOTE — ED PROVIDER NOTE - PHYSICAL EXAMINATION
Attending India Garcia: Gen: NAD, heent: atrauamtic, eomi, perrla, mmm, op pink, uvula midline, neck; nttp, no nuchal rigidity, chest: nttp, no crepitus, cv: rrr, no murmurs, lungs: ctab, abd: soft, nontender, nondistended, no peritoneal signs, +BS, no guarding, ext: wwp, neg homans, ttp mid medial foot, no ttp base of the heal or base of the 5th metatarsal skin: no rash, neuro: awake and alert, following commands, speech clear, sensation and strength intact, no focal deficits

## 2018-07-02 NOTE — ED PROVIDER NOTE - MEDICAL DECISION MAKING DETAILS
Attending India Garcia: left mid foot pain. suspect likely tendinitis. no pain to the base of the 5th metatarsal or h/o trauma to suspect fracture. will pain control, xray, and likely have pt follow up with podiatry.

## 2018-12-17 ENCOUNTER — TRANSCRIPTION ENCOUNTER (OUTPATIENT)
Age: 38
End: 2018-12-17

## 2019-03-12 ENCOUNTER — TRANSCRIPTION ENCOUNTER (OUTPATIENT)
Age: 39
End: 2019-03-12

## 2019-03-29 ENCOUNTER — APPOINTMENT (OUTPATIENT)
Dept: PULMONOLOGY | Facility: CLINIC | Age: 39
End: 2019-03-29
Payer: MEDICAID

## 2019-03-29 VITALS
WEIGHT: 266 LBS | OXYGEN SATURATION: 98 % | BODY MASS INDEX: 40.32 KG/M2 | SYSTOLIC BLOOD PRESSURE: 112 MMHG | DIASTOLIC BLOOD PRESSURE: 58 MMHG | HEART RATE: 72 BPM | HEIGHT: 68 IN

## 2019-03-29 DIAGNOSIS — J45.909 UNSPECIFIED ASTHMA, UNCOMPLICATED: ICD-10-CM

## 2019-03-29 DIAGNOSIS — Z87.891 PERSONAL HISTORY OF NICOTINE DEPENDENCE: ICD-10-CM

## 2019-03-29 DIAGNOSIS — J45.20 MILD INTERMITTENT ASTHMA, UNCOMPLICATED: ICD-10-CM

## 2019-03-29 PROCEDURE — 94727 GAS DIL/WSHOT DETER LNG VOL: CPT

## 2019-03-29 PROCEDURE — 99204 OFFICE O/P NEW MOD 45 MIN: CPT | Mod: 25

## 2019-03-29 PROCEDURE — 94729 DIFFUSING CAPACITY: CPT

## 2019-03-29 PROCEDURE — 94060 EVALUATION OF WHEEZING: CPT

## 2019-03-30 PROBLEM — J45.20 ASTHMA, MILD INTERMITTENT: Status: ACTIVE | Noted: 2019-03-30

## 2019-03-30 PROBLEM — Z87.891 FORMER SMOKER: Status: ACTIVE | Noted: 2019-03-30

## 2019-03-30 NOTE — HISTORY OF PRESENT ILLNESS
[FreeTextEntry1] : He is a 38-year-old man. He has a history of childhood asthma. He was never on medication for asthma until a few years ago. He has been on albuterol as needed. Has been noting wheezing. Mostly at night. It wakes him up at about 4 AM. He stopped smoking 3 months ago. He has 2 pet dogs. He works as a .

## 2019-03-30 NOTE — PROCEDURE
[FreeTextEntry1] : Pulmonary function test March 29, 2019: Minimal obstructive airways disease noted. Significant improvement noted after inhalation of bronchodilator. Mild restriction present. Diffusion capacity was normal.

## 2019-03-30 NOTE — REVIEW OF SYSTEMS
[Cough] : cough [Wheezing] : wheezing [Difficulty Maintaining Sleep] : difficulty maintaining sleep [Fever] : no fever [Nasal Congestion] : no nasal congestion [Hemoptysis] : no hemoptysis [Chest Tightness] : no chest tightness [Chest Discomfort] : no chest discomfort [Nasal Discharge] : no nasal discharge [Heartburn] : no heartburn [Myalgias] : no myalgias [Rash] : no [unfilled] rash [Difficulty Initiating Sleep] : no difficulty falling asleep

## 2019-03-30 NOTE — PHYSICAL EXAM
[General Appearance - In No Acute Distress] : no acute distress [III] : III [Neck Cervical Mass (___cm)] : no neck mass was observed [Murmurs] : no murmurs present [Auscultation Breath Sounds / Voice Sounds] : lungs were clear to auscultation bilaterally [Abnormal Walk] : normal gait [Cyanosis, Localized] : no localized cyanosis [Skin Turgor] : normal skin turgor [] : no rash [No Focal Deficits] : no focal deficits [Oriented To Time, Place, And Person] : oriented to person, place, and time [Impaired Insight] : insight and judgment were intact

## 2019-11-27 ENCOUNTER — TRANSCRIPTION ENCOUNTER (OUTPATIENT)
Age: 39
End: 2019-11-27

## 2019-12-30 ENCOUNTER — TRANSCRIPTION ENCOUNTER (OUTPATIENT)
Age: 39
End: 2019-12-30

## 2020-02-06 ENCOUNTER — TRANSCRIPTION ENCOUNTER (OUTPATIENT)
Age: 40
End: 2020-02-06

## 2020-07-30 ENCOUNTER — EMERGENCY (EMERGENCY)
Facility: HOSPITAL | Age: 40
LOS: 1 days | Discharge: HOME | End: 2020-07-30
Attending: EMERGENCY MEDICINE
Payer: MEDICAID

## 2020-07-30 VITALS
HEART RATE: 78 BPM | SYSTOLIC BLOOD PRESSURE: 126 MMHG | RESPIRATION RATE: 17 BRPM | WEIGHT: 265 LBS | DIASTOLIC BLOOD PRESSURE: 86 MMHG | OXYGEN SATURATION: 94 % | TEMPERATURE: 99 F | HEIGHT: 68 IN

## 2020-07-30 VITALS
OXYGEN SATURATION: 97 % | DIASTOLIC BLOOD PRESSURE: 79 MMHG | TEMPERATURE: 99 F | RESPIRATION RATE: 18 BRPM | SYSTOLIC BLOOD PRESSURE: 124 MMHG | HEART RATE: 69 BPM

## 2020-07-30 DIAGNOSIS — Z98.89 OTHER SPECIFIED POSTPROCEDURAL STATES: Chronic | ICD-10-CM

## 2020-07-30 LAB
ALBUMIN SERPL ELPH-MCNC: 4.3 G/DL — SIGNIFICANT CHANGE UP (ref 3.3–5)
ALP SERPL-CCNC: 90 U/L — SIGNIFICANT CHANGE UP (ref 40–120)
ALT FLD-CCNC: 29 U/L — SIGNIFICANT CHANGE UP (ref 10–45)
ANION GAP SERPL CALC-SCNC: 17 MMOL/L — SIGNIFICANT CHANGE UP (ref 5–17)
AST SERPL-CCNC: 23 U/L — SIGNIFICANT CHANGE UP (ref 10–40)
BASOPHILS # BLD AUTO: 0.04 K/UL — SIGNIFICANT CHANGE UP (ref 0–0.2)
BASOPHILS NFR BLD AUTO: 0.6 % — SIGNIFICANT CHANGE UP (ref 0–2)
BILIRUB SERPL-MCNC: 0.6 MG/DL — SIGNIFICANT CHANGE UP (ref 0.2–1.2)
BUN SERPL-MCNC: 9 MG/DL — SIGNIFICANT CHANGE UP (ref 7–23)
CALCIUM SERPL-MCNC: 9.4 MG/DL — SIGNIFICANT CHANGE UP (ref 8.4–10.5)
CHLORIDE SERPL-SCNC: 102 MMOL/L — SIGNIFICANT CHANGE UP (ref 96–108)
CO2 SERPL-SCNC: 22 MMOL/L — SIGNIFICANT CHANGE UP (ref 22–31)
CREAT SERPL-MCNC: 0.8 MG/DL — SIGNIFICANT CHANGE UP (ref 0.5–1.3)
EOSINOPHIL # BLD AUTO: 0.22 K/UL — SIGNIFICANT CHANGE UP (ref 0–0.5)
EOSINOPHIL NFR BLD AUTO: 3.1 % — SIGNIFICANT CHANGE UP (ref 0–6)
GLUCOSE SERPL-MCNC: 102 MG/DL — HIGH (ref 70–99)
HCT VFR BLD CALC: 43.4 % — SIGNIFICANT CHANGE UP (ref 39–50)
HGB BLD-MCNC: 14.4 G/DL — SIGNIFICANT CHANGE UP (ref 13–17)
IMM GRANULOCYTES NFR BLD AUTO: 0.3 % — SIGNIFICANT CHANGE UP (ref 0–1.5)
LIDOCAIN IGE QN: 120 U/L — HIGH (ref 7–60)
LYMPHOCYTES # BLD AUTO: 2.04 K/UL — SIGNIFICANT CHANGE UP (ref 1–3.3)
LYMPHOCYTES # BLD AUTO: 28.4 % — SIGNIFICANT CHANGE UP (ref 13–44)
MCHC RBC-ENTMCNC: 28.9 PG — SIGNIFICANT CHANGE UP (ref 27–34)
MCHC RBC-ENTMCNC: 33.2 GM/DL — SIGNIFICANT CHANGE UP (ref 32–36)
MCV RBC AUTO: 87.1 FL — SIGNIFICANT CHANGE UP (ref 80–100)
MONOCYTES # BLD AUTO: 0.55 K/UL — SIGNIFICANT CHANGE UP (ref 0–0.9)
MONOCYTES NFR BLD AUTO: 7.6 % — SIGNIFICANT CHANGE UP (ref 2–14)
NEUTROPHILS # BLD AUTO: 4.32 K/UL — SIGNIFICANT CHANGE UP (ref 1.8–7.4)
NEUTROPHILS NFR BLD AUTO: 60 % — SIGNIFICANT CHANGE UP (ref 43–77)
NRBC # BLD: 0 /100 WBCS — SIGNIFICANT CHANGE UP (ref 0–0)
PLATELET # BLD AUTO: 314 K/UL — SIGNIFICANT CHANGE UP (ref 150–400)
POTASSIUM SERPL-MCNC: 3.9 MMOL/L — SIGNIFICANT CHANGE UP (ref 3.5–5.3)
POTASSIUM SERPL-SCNC: 3.9 MMOL/L — SIGNIFICANT CHANGE UP (ref 3.5–5.3)
PROT SERPL-MCNC: 7.3 G/DL — SIGNIFICANT CHANGE UP (ref 6–8.3)
RBC # BLD: 4.98 M/UL — SIGNIFICANT CHANGE UP (ref 4.2–5.8)
RBC # FLD: 12.6 % — SIGNIFICANT CHANGE UP (ref 10.3–14.5)
SODIUM SERPL-SCNC: 141 MMOL/L — SIGNIFICANT CHANGE UP (ref 135–145)
WBC # BLD: 7.19 K/UL — SIGNIFICANT CHANGE UP (ref 3.8–10.5)
WBC # FLD AUTO: 7.19 K/UL — SIGNIFICANT CHANGE UP (ref 3.8–10.5)

## 2020-07-30 PROCEDURE — 96374 THER/PROPH/DIAG INJ IV PUSH: CPT | Mod: XU

## 2020-07-30 PROCEDURE — 83690 ASSAY OF LIPASE: CPT

## 2020-07-30 PROCEDURE — 99285 EMERGENCY DEPT VISIT HI MDM: CPT

## 2020-07-30 PROCEDURE — 93005 ELECTROCARDIOGRAM TRACING: CPT

## 2020-07-30 PROCEDURE — 74177 CT ABD & PELVIS W/CONTRAST: CPT

## 2020-07-30 PROCEDURE — 71046 X-RAY EXAM CHEST 2 VIEWS: CPT | Mod: 26

## 2020-07-30 PROCEDURE — 74177 CT ABD & PELVIS W/CONTRAST: CPT | Mod: 26

## 2020-07-30 PROCEDURE — 80053 COMPREHEN METABOLIC PANEL: CPT

## 2020-07-30 PROCEDURE — 99284 EMERGENCY DEPT VISIT MOD MDM: CPT | Mod: 25

## 2020-07-30 PROCEDURE — 71046 X-RAY EXAM CHEST 2 VIEWS: CPT

## 2020-07-30 PROCEDURE — 85027 COMPLETE CBC AUTOMATED: CPT

## 2020-07-30 PROCEDURE — 93010 ELECTROCARDIOGRAM REPORT: CPT

## 2020-07-30 PROCEDURE — 96375 TX/PRO/DX INJ NEW DRUG ADDON: CPT | Mod: XU

## 2020-07-30 PROCEDURE — 83615 LACTATE (LD) (LDH) ENZYME: CPT

## 2020-07-30 RX ORDER — OXYCODONE HYDROCHLORIDE 5 MG/1
1 TABLET ORAL
Qty: 12 | Refills: 0
Start: 2020-07-30 | End: 2020-08-01

## 2020-07-30 RX ORDER — MORPHINE SULFATE 50 MG/1
2 CAPSULE, EXTENDED RELEASE ORAL ONCE
Refills: 0 | Status: DISCONTINUED | OUTPATIENT
Start: 2020-07-30 | End: 2020-07-30

## 2020-07-30 RX ORDER — MORPHINE SULFATE 50 MG/1
4 CAPSULE, EXTENDED RELEASE ORAL ONCE
Refills: 0 | Status: DISCONTINUED | OUTPATIENT
Start: 2020-07-30 | End: 2020-07-30

## 2020-07-30 RX ORDER — ACETAMINOPHEN 500 MG
975 TABLET ORAL ONCE
Refills: 0 | Status: COMPLETED | OUTPATIENT
Start: 2020-07-30 | End: 2020-07-30

## 2020-07-30 RX ORDER — KETOROLAC TROMETHAMINE 30 MG/ML
15 SYRINGE (ML) INJECTION ONCE
Refills: 0 | Status: DISCONTINUED | OUTPATIENT
Start: 2020-07-30 | End: 2020-07-30

## 2020-07-30 RX ADMIN — Medication 975 MILLIGRAM(S): at 12:01

## 2020-07-30 RX ADMIN — Medication 975 MILLIGRAM(S): at 14:55

## 2020-07-30 RX ADMIN — Medication 15 MILLIGRAM(S): at 14:43

## 2020-07-30 RX ADMIN — MORPHINE SULFATE 2 MILLIGRAM(S): 50 CAPSULE, EXTENDED RELEASE ORAL at 14:43

## 2020-07-30 NOTE — ED PROVIDER NOTE - PHYSICAL EXAMINATION
Vital signs reviewed  GENERAL: Patient nontoxic appearing, NAD  HEAD: NCAT  EYES: Anicteric  ENT: MMM  NECK: Supple, non tender  RESPIRATORY: Normal respiratory effort. CTA B/L. No wheezing, rales, rhonchi  CARDIOVASCULAR: Regular rate and rhythm  ABDOMEN: Soft. Nondistended. No guarding or rebound. +CVA tenderness on the right. +RLQ and RUQ TTP.   MUSCULOSKELETAL/EXTREMITIES: Brisk cap refill. 2+ radial pulses. No leg edema.  SKIN:  Warm and dry  NEURO: AAOx3. No gross FND.  PSYCHIATRIC: Cooperative. Affect appropriate.

## 2020-07-30 NOTE — ED PROVIDER NOTE - NSFOLLOWUPINSTRUCTIONS_ED_ALL_ED_FT
You have been diagnosed with a kidney stone. To control the pain, Tylenol 650mg every 6 hours. These are both over the counter medications that you can  at your local pharmacy without a prescription. We also sent a stronger pain medication to your pharmacy that you should only take when you are still having pain despite trying Tylenol. If you are still having severe pain in 48 hours you should return to the emergency room or a urologist if able. If you began having fever, uncontrollable nausea and vomiting then you also need to come back to the ED.

## 2020-07-30 NOTE — ED PROVIDER NOTE - CARE PROVIDER_API CALL
Danny Rodrigez  UROLOGY  [address] [place], [place] [address]  Phone: [phone]  Fax: [phone]  Follow Up Time:

## 2020-07-30 NOTE — ED PROVIDER NOTE - CLINICAL SUMMARY MEDICAL DECISION MAKING FREE TEXT BOX
38 y/o M hx of gastric band and kidney stones p/w right flank pain. vitals WNL, on exam pt has TTP on RLQ and RUQ with +CVA tenderness. differentials include renal colic vs appendicits vs cholecystitis vs gastric band complications. will evaluate with abd labs and CT

## 2020-07-30 NOTE — ED PROVIDER NOTE - ATTENDING CONTRIBUTION TO CARE
r flank pain7/6/20 MS Dr Rodrigo Keith gastric sleeve  +R cvat, wounds cdi  CTAP, consult surg. Symptomatic treatment. labs

## 2020-07-30 NOTE — ED PROVIDER NOTE - PATIENT PORTAL LINK FT
You can access the FollowMyHealth Patient Portal offered by [facility] by registering at the following website: [email or web address]. By joining [email or web address]’s FollowMyHealth portal, you will also be able to view your health information using other applications (apps) compatible with our system.

## 2020-07-30 NOTE — ED ADULT NURSE NOTE - OBJECTIVE STATEMENT
38 y/o male coming in from home complaining of right flank pain. AOx4, ambulatory, PMH gastric sleeve surgery on July 6th. Pt. states he began to experience right sided flank and abdominal pain this morning and nausea. Pain is 8/10 and constant. Abdomen soft, non-distended, tender to palpation on upper right quadrant. Denies any changes in urination or bowel movements. Reports hx of kidney stones and states this feels similar. Pt. has not had any complications since his gastric sleeve surgery in July. Denies any other complaints. Denies chest pain, fever, chills, SOB, V/D. 18G RAC. VSS. WIll continue to reassess.

## 2020-07-30 NOTE — ED PROVIDER NOTE - NSFOLLOWUPCLINICS_GEN_ALL_ED_FT
[facility]  Urology  [address] [facility] [facility] [address] [facility] [address]  [place] [address]  Phone: [phone]  Fax:   Follow Up Time:     [facility]  Urology  [address]th [place] [address]  Phone: [phone]  Fax:   Follow Up Time:     [facility]  Urology  [address] [facility] [address] [facility] [facility] - [address]rd [place] [address]  Phone: [phone]  Fax:   Follow Up Time:

## 2020-07-30 NOTE — ED POST DISCHARGE NOTE - OTHER COMMUNICATION
Patient called wanting oxycodone rx sent to different pharmacy.  Called [facility], RX was not filled and prescription cancelled.  New Prescription sent to preferred pharmacy.  -Willam Soriano PA-C

## 2020-07-30 NOTE — ED PROVIDER NOTE - OBJECTIVE STATEMENT
38 y/o M PMhx kidney stones p/w right sided flank pain x 2 hours ago. pt states the pain begins on the right side and radiates down to the groin. pt states his sxs feel similar to the last kidney stone. pt recently had gastric band surgery done by Dr Keith. denies fever, chills, SOB, CP, n/v/d, hematuria, testicular pain/swelling.

## 2020-07-30 NOTE — CONSULT NOTE ADULT - ASSESSMENT
39 year old male with history of recent bariatric sleeve at [facility] presenting with right sided abdominal pain radiating to the groin. Found to have an obstructing kidney stone.     Plan:  - Surgical site with no leakage or stricture noted on CT  - Found to have right kidney stone, likely source of pain  - Will discuss with bariatric attending, Dr Zion Devi Surgery, [id number]

## 2020-07-30 NOTE — ED PROVIDER NOTE - CARE PROVIDERS DIRECT ADDRESSES
[email or web address], [email or web address]

## 2020-07-30 NOTE — CONSULT NOTE ADULT - SUBJECTIVE AND OBJECTIVE BOX
SURGERY CONSULT NOTE     HPI: 39 year old male with past medical history of asthma and recent bariatric gastric sleeve at [facility] on 7/6/20 now presenting with acute onset right sided abdominal pain. He reports the pain radiates down to his right groin. He reports some mild nausea, but no vomiting, diarrhea. He reports that since his surgery he has had no problems with eating, no vomiting or pain. Having bowel movements and passing flatus. He reports dysuria.     PMHx: Asthma  No pertinent past medical history    PSHx: S/P appendectomy  S/P tonsillectomy    Medications (inpatient):   Medications (PRN):  Allergies: No Known Allergies  (Intolerances: )  Social Hx:   Family Hx: No pertinent family history      Physical Exam  T(C): 36.9  HR: 69 (69 - 78)  BP: 96/72 (96/72 - 126/86)  RR: 18 (17 - 18)  SpO2: 97% (94% - 97%)  Tmax: T(C): , Max: 37.1 (07-30-20 @ 10:30)    General: well developed, well nourished, NAD  Respiratory: airway patent, respirations unlabored  CVS: regular rate and rhythm  Abdomen: soft, tender to palpation in RUQ, RLQ, R CVA tenderness, obese abdomen, surgical scars intact without erythema or drainage  Extremities: no edema, sensation and movement grossly intact  Skin: warm, dry, appropriate color    Labs:                        14.4   7.19  )-----------( 314      ( 30 Jul 2020 11:40 )             43.4       07-30    141  |  102  |  9   ----------------------------<  102<H>  3.9   |  22  |  0.80    Ca    9.4      30 Jul 2020 11:40    TPro  7.3  /  Alb  4.3  /  TBili  0.6  /  DBili  x   /  AST  23  /  ALT  29  /  AlkPhos  90  07-30            Imaging and other studies:    < from: CT Abdomen and Pelvis w/ Oral Cont and w/ IV Cont (07.30.20 @ 12:46) >  FINDINGS:  LOWER CHEST: Within normal limits.    LIVER: Within normal limits.  BILE DUCTS: Normal caliber.  GALLBLADDER: Within normal limits.  SPLEEN: Within normal limits.  PANCREAS: Within normal limits.  ADRENALS: Withinnormal limits.  KIDNEYS/URETERS: A 0.2 cm proximal right ureteral stone with very mild upstream hydroureteronephrosis. A 0.2 cm nonobstructing left interpole stone. No left-sided hydronephrosis. A subcentimeter left inter-/lower pole cyst.    BLADDER: Within normal limits.  REPRODUCTIVE ORGANS: Normal size prostate.    BOWEL: Status post gastrectomy. No bowel wall thickening or obstruction. Appendectomy.  PERITONEUM: No ascites.  VESSELS: Within normal limits.  RETROPERITONEUM/LYMPH NODES: No lymphadenopathy.  ABDOMINAL WALL: Within normal limits  BONES: Degenerative changes of the spine and left hip.    IMPRESSION:    A 0.2 cm proximal RIGHT ureteral stone resulting in mild hydroureteronephrosis.    < end of copied text >

## 2020-07-30 NOTE — ED PROVIDER NOTE - NS ED ROS FT
Constitutional: No fever, chills.  Eyes:  No visual changes  ENMT:  No neck pain  Cardiac:  No chest pain  Respiratory:  No cough, SOB  GI:  +abd pain, no nausea/vomiting   :  No dysuria, hematuria  MS:  No back pain.  Neuro:  No headache or lightheadedness  Skin:  No skin rash  Endocrine: No history of thyroid disease or diabetes.  Except as documented in the HPI,  all other systems are negative.

## 2021-07-30 ENCOUNTER — TRANSCRIPTION ENCOUNTER (OUTPATIENT)
Age: 41
End: 2021-07-30

## 2021-09-20 ENCOUNTER — APPOINTMENT (OUTPATIENT)
Dept: INTERNAL MEDICINE | Facility: CLINIC | Age: 41
End: 2021-09-20
Payer: MEDICAID

## 2021-09-20 ENCOUNTER — NON-APPOINTMENT (OUTPATIENT)
Age: 41
End: 2021-09-20

## 2021-09-20 VITALS
DIASTOLIC BLOOD PRESSURE: 78 MMHG | TEMPERATURE: 97.5 F | BODY MASS INDEX: 31.07 KG/M2 | HEIGHT: 67.32 IN | OXYGEN SATURATION: 98 % | WEIGHT: 200.26 LBS | SYSTOLIC BLOOD PRESSURE: 125 MMHG | HEART RATE: 73 BPM

## 2021-09-20 DIAGNOSIS — R59.0 LOCALIZED ENLARGED LYMPH NODES: ICD-10-CM

## 2021-09-20 DIAGNOSIS — Z98.84 BARIATRIC SURGERY STATUS: ICD-10-CM

## 2021-09-20 PROCEDURE — 99386 PREV VISIT NEW AGE 40-64: CPT | Mod: 25

## 2021-09-20 PROCEDURE — 99203 OFFICE O/P NEW LOW 30 MIN: CPT | Mod: 25

## 2021-09-20 RX ORDER — MONTELUKAST 10 MG/1
10 TABLET, FILM COATED ORAL
Qty: 30 | Refills: 2 | Status: DISCONTINUED | COMMUNITY
Start: 2019-03-29 | End: 2021-09-20

## 2021-09-20 RX ORDER — ALBUTEROL SULFATE 90 UG/1
108 (90 BASE) AEROSOL, METERED RESPIRATORY (INHALATION) EVERY 6 HOURS
Qty: 1 | Refills: 5 | Status: DISCONTINUED | COMMUNITY
Start: 2019-03-29 | End: 2021-09-20

## 2021-09-20 RX ORDER — ALBUTEROL SULFATE 0.63 MG/3ML
0.63 SOLUTION RESPIRATORY (INHALATION) 4 TIMES DAILY
Qty: 1 | Refills: 5 | Status: DISCONTINUED | COMMUNITY
Start: 2019-03-29 | End: 2021-09-20

## 2021-09-22 NOTE — ASSESSMENT
[FreeTextEntry1] : Blood work done today\par Check vitamin levels given history of bariatric surgery\par To have ultrasound given axillary swelling\par Check testosterone levels estradiol levels, check PSA level and CBC for polycythemia\par Works as a  denies any cardiac symptoms

## 2021-09-22 NOTE — HISTORY OF PRESENT ILLNESS
[FreeTextEntry1] : Patient presents for annual physical [de-identified] : Has history of bariatric surgery\par Has abdominal discomfort has seen bariatric surgeon\par Has not had vitamin levels and blood work done\par Receiving testosterone replacement therapy from [place] taking 100 mg weekly\par Denies any chest pain chest tightness shortness of breath\par Has noticed swelling below the left axilla denies any trauma cough fevers chills\par Denies any first-degree relatives with colon cancer

## 2021-09-22 NOTE — PHYSICAL EXAM
[Normal] : normal gait, coordination grossly intact, no focal deficits and deep tendon reflexes were 2+ and symmetric [de-identified] : Swelling underneath the left axilla firm nonmobile bile

## 2021-09-22 NOTE — HEALTH RISK ASSESSMENT
[Good] : ~his/her~  mood as  good [FreeTextEntry1] : Health maintenance [No] : No [0] : 2) Feeling down, depressed, or hopeless: Not at all (0) [PHQ-2 Negative - No further assessment needed] : PHQ-2 Negative - No further assessment needed [de-identified] : Bariatric surgery [[id number]] : 0

## 2021-09-24 LAB
25(OH)D3 SERPL-MCNC: 32.9 NG/ML
ALBUMIN SERPL ELPH-MCNC: 4.4 G/DL
ALP BLD-CCNC: 72 U/L
ALT SERPL-CCNC: 20 U/L
ANION GAP SERPL CALC-SCNC: 14 MMOL/L
APPEARANCE: CLEAR
APPEARANCE: CLEAR
AST SERPL-CCNC: 33 U/L
BACTERIA: NEGATIVE
BASOPHILS # BLD AUTO: 0.06 K/UL
BASOPHILS NFR BLD AUTO: 0.8 %
BILIRUB SERPL-MCNC: 0.9 MG/DL
BILIRUBIN URINE: NEGATIVE
BILIRUBIN URINE: NEGATIVE
BLOOD URINE: NEGATIVE
BLOOD URINE: NEGATIVE
BUN SERPL-MCNC: 6 MG/DL
CALCIUM SERPL-MCNC: 9.7 MG/DL
CHLORIDE SERPL-SCNC: 99 MMOL/L
CHOLEST SERPL-MCNC: 151 MG/DL
CO2 SERPL-SCNC: 28 MMOL/L
COLOR: YELLOW
COLOR: YELLOW
CREAT SERPL-MCNC: 0.82 MG/DL
EOSINOPHIL # BLD AUTO: 0.17 K/UL
EOSINOPHIL NFR BLD AUTO: 2.2 %
ESTIMATED AVERAGE GLUCOSE: 103 MG/DL
ESTRADIOL SERPL-MCNC: 56 PG/ML
GLUCOSE QUALITATIVE U: NEGATIVE
GLUCOSE QUALITATIVE U: NEGATIVE
GLUCOSE SERPL-MCNC: 84 MG/DL
HBA1C MFR BLD HPLC: 5.2 %
HCT VFR BLD CALC: 50.7 %
HDLC SERPL-MCNC: 42 MG/DL
HGB BLD-MCNC: 15.9 G/DL
HYALINE CASTS: 0 /LPF
IMM GRANULOCYTES NFR BLD AUTO: 0.3 %
KETONES URINE: NORMAL
KETONES URINE: NORMAL
LDLC SERPL CALC-MCNC: 100 MG/DL
LEUKOCYTE ESTERASE URINE: NEGATIVE
LEUKOCYTE ESTERASE URINE: NEGATIVE
LYMPHOCYTES # BLD AUTO: 2.2 K/UL
LYMPHOCYTES NFR BLD AUTO: 27.9 %
MAGNESIUM SERPL-MCNC: 2 MG/DL
MAN DIFF?: NORMAL
MCHC RBC-ENTMCNC: 30.3 PG
MCHC RBC-ENTMCNC: 31.4 GM/DL
MCV RBC AUTO: 96.8 FL
MENADIONE SERPL-MCNC: 0.15 NG/ML
MICROSCOPIC-UA: NORMAL
MONOCYTES # BLD AUTO: 0.54 K/UL
MONOCYTES NFR BLD AUTO: 6.8 %
NEUTROPHILS # BLD AUTO: 4.9 K/UL
NEUTROPHILS NFR BLD AUTO: 62 %
NITRITE URINE: NEGATIVE
NITRITE URINE: NEGATIVE
NONHDLC SERPL-MCNC: 109 MG/DL
PH URINE: 6.5
PH URINE: 6.5
PHOSPHATE SERPL-MCNC: 3.7 MG/DL
PLATELET # BLD AUTO: 403 K/UL
POTASSIUM SERPL-SCNC: 4.3 MMOL/L
PROT SERPL-MCNC: 7.3 G/DL
PROTEIN URINE: ABNORMAL
PROTEIN URINE: ABNORMAL
PSA SERPL-MCNC: 0.81 NG/ML
RBC # BLD: 5.24 M/UL
RBC # FLD: 13.3 %
RED BLOOD CELLS URINE: 2 /HPF
SODIUM SERPL-SCNC: 141 MMOL/L
SPECIFIC GRAVITY URINE: 1.03
SPECIFIC GRAVITY URINE: 1.03
SQUAMOUS EPITHELIAL CELLS: 2 /HPF
TESTOST BND SERPL-MCNC: 32.6 PG/ML
TESTOSTERONE TOTAL S: 988 NG/DL
TRIGL SERPL-MCNC: 45 MG/DL
TSH SERPL-ACNC: 0.62 UIU/ML
UROBILINOGEN URINE: ABNORMAL
UROBILINOGEN URINE: ABNORMAL
VIT B12 SERPL-MCNC: 544 PG/ML
WBC # FLD AUTO: 7.89 K/UL
WHITE BLOOD CELLS URINE: 3 /HPF

## 2021-09-25 LAB
ESTROGEN SERPL-MCNC: 141 PG/ML
PANTOTHENATE SERPLBLD-MCNC: 71.5 NG/ML

## 2021-09-27 LAB
VIT B1 SERPL-MCNC: 124.8 NMOL/L
VIT B2 SERPL-MCNC: 223 UG/L

## 2021-09-29 ENCOUNTER — APPOINTMENT (OUTPATIENT)
Dept: ULTRASOUND IMAGING | Facility: CLINIC | Age: 41
End: 2021-09-29
Payer: MEDICAID

## 2021-09-29 PROCEDURE — 76882 US LMTD JT/FCL EVL NVASC XTR: CPT | Mod: LT

## 2021-10-01 ENCOUNTER — NON-APPOINTMENT (OUTPATIENT)
Age: 41
End: 2021-10-01

## 2021-10-01 LAB
NICOTINAMIDE: 157.1 NG/ML
NICOTINIC ACID: <5 NG/ML
VIT B6 SERPL-MCNC: 30.1 UG/L

## 2021-11-15 ENCOUNTER — EMERGENCY (EMERGENCY)
Facility: HOSPITAL | Age: 41
LOS: 1 days | Discharge: HOME | End: 2021-11-15
Attending: STUDENT IN AN ORGANIZED HEALTH CARE EDUCATION/TRAINING PROGRAM
Payer: MEDICAID

## 2021-11-15 VITALS
DIASTOLIC BLOOD PRESSURE: 86 MMHG | SYSTOLIC BLOOD PRESSURE: 130 MMHG | WEIGHT: 184.97 LBS | HEART RATE: 78 BPM | TEMPERATURE: 98 F | HEIGHT: 68 IN | OXYGEN SATURATION: 97 % | RESPIRATION RATE: 18 BRPM

## 2021-11-15 VITALS
RESPIRATION RATE: 17 BRPM | HEART RATE: 82 BPM | DIASTOLIC BLOOD PRESSURE: 81 MMHG | OXYGEN SATURATION: 97 % | SYSTOLIC BLOOD PRESSURE: 113 MMHG | TEMPERATURE: 98 F

## 2021-11-15 DIAGNOSIS — Z98.89 OTHER SPECIFIED POSTPROCEDURAL STATES: Chronic | ICD-10-CM

## 2021-11-15 DIAGNOSIS — Z90.3 ACQUIRED ABSENCE OF STOMACH [PART OF]: Chronic | ICD-10-CM

## 2021-11-15 PROCEDURE — 73030 X-RAY EXAM OF SHOULDER: CPT

## 2021-11-15 PROCEDURE — 90471 IMMUNIZATION ADMIN: CPT

## 2021-11-15 PROCEDURE — 71045 X-RAY EXAM CHEST 1 VIEW: CPT

## 2021-11-15 PROCEDURE — 72125 CT NECK SPINE W/O DYE: CPT | Mod: MG

## 2021-11-15 PROCEDURE — 73030 X-RAY EXAM OF SHOULDER: CPT | Mod: 26,RT

## 2021-11-15 PROCEDURE — 90715 TDAP VACCINE 7 YRS/> IM: CPT

## 2021-11-15 PROCEDURE — 73060 X-RAY EXAM OF HUMERUS: CPT | Mod: 26,RT

## 2021-11-15 PROCEDURE — 70450 CT HEAD/BRAIN W/O DYE: CPT | Mod: 26,MG

## 2021-11-15 PROCEDURE — 71045 X-RAY EXAM CHEST 1 VIEW: CPT | Mod: 26

## 2021-11-15 PROCEDURE — 99285 EMERGENCY DEPT VISIT HI MDM: CPT | Mod: 25

## 2021-11-15 PROCEDURE — G1004: CPT

## 2021-11-15 PROCEDURE — 70450 CT HEAD/BRAIN W/O DYE: CPT | Mod: MG

## 2021-11-15 PROCEDURE — 72125 CT NECK SPINE W/O DYE: CPT | Mod: 26,MG

## 2021-11-15 PROCEDURE — 73060 X-RAY EXAM OF HUMERUS: CPT

## 2021-11-15 PROCEDURE — 73080 X-RAY EXAM OF ELBOW: CPT

## 2021-11-15 PROCEDURE — 99285 EMERGENCY DEPT VISIT HI MDM: CPT

## 2021-11-15 PROCEDURE — 73080 X-RAY EXAM OF ELBOW: CPT | Mod: 26,RT

## 2021-11-15 RX ORDER — TETANUS TOXOID, REDUCED DIPHTHERIA TOXOID AND ACELLULAR PERTUSSIS VACCINE, ADSORBED 5; 2.5; 8; 8; 2.5 [IU]/.5ML; [IU]/.5ML; UG/.5ML; UG/.5ML; UG/.5ML
0.5 SUSPENSION INTRAMUSCULAR ONCE
Refills: 0 | Status: COMPLETED | OUTPATIENT
Start: 2021-11-15 | End: 2021-11-15

## 2021-11-15 RX ORDER — ACETAMINOPHEN 500 MG
650 TABLET ORAL ONCE
Refills: 0 | Status: COMPLETED | OUTPATIENT
Start: 2021-11-15 | End: 2021-11-15

## 2021-11-15 RX ORDER — SODIUM CHLORIDE 9 MG/ML
1000 INJECTION INTRAMUSCULAR; INTRAVENOUS; SUBCUTANEOUS ONCE
Refills: 0 | Status: COMPLETED | OUTPATIENT
Start: 2021-11-15 | End: 2021-11-15

## 2021-11-15 RX ADMIN — TETANUS TOXOID, REDUCED DIPHTHERIA TOXOID AND ACELLULAR PERTUSSIS VACCINE, ADSORBED 0.5 MILLILITER(S): 5; 2.5; 8; 8; 2.5 SUSPENSION INTRAMUSCULAR at 02:51

## 2021-11-15 RX ADMIN — SODIUM CHLORIDE 1000 MILLILITER(S): 9 INJECTION INTRAMUSCULAR; INTRAVENOUS; SUBCUTANEOUS at 02:53

## 2021-11-15 RX ADMIN — Medication 650 MILLIGRAM(S): at 02:52

## 2021-11-15 NOTE — ED PROVIDER NOTE - PATIENT PORTAL LINK FT
You can access the FollowMyHealth Patient Portal offered by [facility] by registering at the following website: [email or web address]. By joining [employer]’s FollowMyHealth portal, you will also be able to view your health information using other applications (apps) compatible with our system.

## 2021-11-15 NOTE — ED PROVIDER NOTE - OBJECTIVE STATEMENT
40 yo M with hx of gastric bypass presents with abrasion of head, headache, pain in RT shoulder and upper arm s/p fall an hr ago. States he had few drinks today. Was taking his dog for a walk, missed step and fell from 7 stairs. Landed on his head and then RT shoulder. Pt is left handed. No LOC. Called out to his wife who helped him up. Ambulatory since then. Did not take anything for pain. No nausea, vomiting, chest pain, shortness of breath, abd pain, diarrhea, urinary complain, fever, chills. No preceding sxs/illness.

## 2021-11-15 NOTE — ED ADULT NURSE NOTE - OBJECTIVE STATEMENT
41y Male AOx4 presents to the ED s/p mechanical fall down 7 steps. Pt states he was going down the steps with his dog, tripped over his dog and hit his head then right side of his body. Denies LOC, is not on blood thinners. Endorses drinking 2-3 vodka cranberries prior to fall. Abrasion noted to top right side of head, back of right shoulder and right elbow. No active bleeding or obvious deformity noted. Denies N/V, fever/chills, SOB, chest pain. Spontaneous/unlabored respirations, speaking in full sentences. Side rails up, bed in lowest position, oriented to call bell, safety maintained.
Diabetic ketoacidosis without coma associated with type 1 diabetes mellitus

## 2021-11-15 NOTE — ED PROVIDER NOTE - PROGRESS NOTE DETAILS
Pedro Martel,  PGY-3: results are discussed with the patient, no new complains. Return precautions are discussed.

## 2021-11-15 NOTE — ED PROVIDER NOTE - PHYSICAL EXAMINATION
Gen: appears intoxicated  Head: NC/NT  Eyes: PERRL, EOMI   ENT: airway patent, mmm  CV: RRR, +S1/S2   Resp: CTAB, symmetric breath sounds, no W/R/R  GI:  abdomen soft non-distended, NTTP   Back: no spinal ttp, +RT thoracic paraspinal ttp  Extremities - FROM and no ttp of LUE, lower extremities. +ttp of RT shoulder and upper arm. Able to touch LT shoulder with RT hand, flex shoulder to abt 120 degress  Skin: +abrasion of scalp in md parietal region, +abrasion of RT scapular region  Neuro: A&Ox4, following commands, LUE and lower extremities: 5/5 strength, sensation intact; strength testing limited in RT upper extremity due to pain, equal  strength however

## 2021-11-15 NOTE — ED ADULT NURSE NOTE - NSIMPLEMENTINTERV_GEN_ALL_ED
Implemented All Universal Safety Interventions:  [place] to call system. Call bell, personal items and telephone within reach. Instruct patient to call for assistance. Room bathroom lighting operational. Non-slip footwear when patient is off stretcher. Physically safe environment: no spills, clutter or unnecessary equipment. Stretcher in lowest position, wheels locked, appropriate side rails in place.

## 2021-11-15 NOTE — ED PROVIDER NOTE - ATTENDING CONTRIBUTION TO CARE
41 year old male s/p gastric bypass presented to ED with headache, abrasions over right forehead, and right shoulder pain s/p mechanical fall. Denies N/V. No vision changes. Pt well appearing on exam. No anatomical snuff box tenderness on exam. Will obtain CT and x-rays to assess for acute injuries. Analgesia PRN. Reassess

## 2021-11-15 NOTE — ED PROVIDER NOTE - CLINICAL SUMMARY MEDICAL DECISION MAKING FREE TEXT BOX
Significant trauma to the head will eval for bleed. eval for fx of RT upper extremity. Images, pain mgmt, IVF, tdap (not sure when the last one was). If imaging is negative discharge home with wife.

## 2021-11-15 NOTE — ED ADULT TRIAGE NOTE - CHIEF COMPLAINT QUOTE
Fell down 7 steps of stairs. Hit head, laceration n head, abrasion on back and complaining of right elbow pain. "Had a few drinks".

## 2021-11-15 NOTE — ED PROVIDER NOTE - NSFOLLOWUPINSTRUCTIONS_ED_ALL_ED_FT
You were seen for fall.     Follow up with orthopedics for continue to care to evaluate for any ligament/tendon tears.    No signs of emergency medical condition on today's workup.  Your results are attached with your discharge instructions, please review them with your primary care physician. If there is a result pending, you will receive a call if test is positive.    A presumptive diagnosis is made today, but further evaluation may be required by your primary care doctor and/or specialist for a definitive diagnosis. Therefore, follow up as directed and if symptoms change/worsen or any emergency conditions, please return to the ER.    For pain or fever you can ibuprofen (motrin, advil) or tylenol as needed, as directed on packaging.    You can use 400-600mg Ibuprofen (such as motrin or advil) every 6 to 8 hours as needed for pain control.  Take ibuprofen with food or milk to lessen stomach upset.  This is an over-the-counter medication please respect the warnings on the label. All medications come with certain risks and side effects that you need to discuss with your doctor, especially if you are taking them for a prolonged period.    You can use 500-1000mg Tylenol every 6 hours for pain - as needed.  This is an over-the-counter medications - please respect the warnings on the label. This medication come with certain risks and side effects that you need to discuss with your doctor, especially if you are taking it for a prolonged period.      If needed, call patient access services at [phone] to find a primary care doctor, or call at [phone] to make an appointment at the clinic.

## 2022-05-19 ENCOUNTER — APPOINTMENT (OUTPATIENT)
Dept: ORTHOPEDIC SURGERY | Facility: CLINIC | Age: 42
End: 2022-05-19
Payer: MEDICAID

## 2022-05-19 VITALS
WEIGHT: 187 LBS | SYSTOLIC BLOOD PRESSURE: 125 MMHG | DIASTOLIC BLOOD PRESSURE: 78 MMHG | HEART RATE: 73 BPM | HEIGHT: 67.32 IN | BODY MASS INDEX: 29.01 KG/M2

## 2022-05-19 PROCEDURE — 73502 X-RAY EXAM HIP UNI 2-3 VIEWS: CPT | Mod: LT

## 2022-05-19 PROCEDURE — 99203 OFFICE O/P NEW LOW 30 MIN: CPT

## 2022-05-23 NOTE — PHYSICAL EXAM
[de-identified] : Oriented to time, place, person\par Mood: Normal\par Affect: Normal\par Appearance: Healthy, well appearing, no acute distress.\par Gait: Normal\par Assistive Devices: None\par \par Left Hip Exam:\par \par Skin: Clean, dry, intact\par Inspection: No obvious deformity, no swelling.\par Pulses: 2+ DP/PT pulses \par ROM: 100 flexion. Internal rotation to 0. External rotation to 30. \par Painful ROM: Pain with further motion, + groin pain.\par Tenderness: No tenderness over greater trochanter. No tenderness at gluteal insertion. No paraspinal tenderness. No axial lumbar tenderness. No sacroiliac tenderness. No TTP over the ASIS/Iliac crest.\par Strength: 5/5 hip flexion, 5/5 gluteal strength, 5/5 hip ADD, 5/5 hip ABD, 5/5 Q/H, 5/5 TA/GS/EHL\par Neuro: Sensation intact to light touch throughout, DTR normal\par Additional tests: Negative impingement test, Negative JAMES  [de-identified] : The following radiographs were ordered and read by me during this patients visit. I reviewed each radiograph in detail with the patient and discussed the findings as highlighted below. \par \par AP pelvis and lateral view of the left hip were obtained today, 05/19/2022, that show no acute bony injury, including fracture or dislocation. There is severe hip degenerative change seen. There is no gross pelvic of hip malalignment. No significant other obvious osseous abnormality, otherwise unremarkable.

## 2022-05-23 NOTE — ADDENDUM
[FreeTextEntry1] : This note was written by Divine Huber on 05/19/2022 acting solely as a scribe for Dr. Silver Campuzano.\par \par All medical record entries made by the Scribe were at my, Dr. Silver Campuzano, direction and personally dictated by me on 05/19/2022. I have personally reviewed the chart and agree that the record accurately reflects my personal performance of the history, physical exam, assessment and plan.

## 2022-05-23 NOTE — HISTORY OF PRESENT ILLNESS
[de-identified] : 41 year old male s/p gastric sleeve 2020 presents today with left hip pain since 2018. He is dance . He has been told in the past that he may need THR in the near future. He had hip cortisone injection which was not helpful. Pain has been getting progressively worse. 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 Report mild groin pain. He is not taking pain medications. THC helps provides relief. \par \par The patient's past medical history, past surgical history, medications and allergies were reviewed by me today with the patient and documented accordingly. In addition, the patient's family and social history, which were noncontributory to this visit, were reviewed also.

## 2022-05-23 NOTE — DISCUSSION/SUMMARY
[de-identified] : 42 y/o male with left hip osteoarthritis. \par \par The patient presents with pain to the left hip consistent with osteoarthritis and likely chronic ERON.  I discussed the treatment of degenerative hip arthritis with the patient at length today, as well as the chronic degenerative process and continued radiographic progression of the disease. Symptoms may wax and wane and can be time/activity dependant. I described the spectrum of treatment from nonoperative modalities to total joint arthroplasty. Noninvasive and nonoperative treatment modalities include weight reduction, activity modification with low impact exercise, PRN use of acetaminophen or anti-inflammatory medication if tolerated, glucosamine/chondroitin supplements, and physical therapy. Additional treatments can include intra-articular corticosteroid injection and alternative therapies (HA, PRP, Stem cell). Definitive treatment would include total joint arthroplasty including hip arthroplasty. Referral to an arthroplasty specialist would be recommended if LUKASZ is being considered. \par \par The risks and benefits of each treatment options was discussed and all questions were answered.\par \par Current recommendations: Consider arthroplasty evaluation given activity level.\par \par Follow-up as needed

## 2022-05-27 ENCOUNTER — APPOINTMENT (OUTPATIENT)
Dept: ORTHOPEDIC SURGERY | Facility: CLINIC | Age: 42
End: 2022-05-27
Payer: MEDICAID

## 2022-05-27 PROCEDURE — 99214 OFFICE O/P EST MOD 30 MIN: CPT

## 2022-05-27 NOTE — PHYSICAL EXAM
[de-identified] : Patient is well nourished, well-developed, in no acute distress, with appropriate mood and affect. The patient is oriented to time, place, and person. Respirations are even and unlabored. Gait evaluation reveals a limp. There is no inguinal adenopathy. Examination of the contralateral hip shows normal range of motion, strength, no tenderness, and intact skin. The affected limb is well-perfused, shows a grossly normal motor and sensory examination. Examination of the hip shows no skin lesions. Hip motion is reduced and causes pain. FADIR is positive and JAMES is positive. Stinchfield test is positive. Leg lengths are approximately equal. Both hips are stable and muscle strength is normal. Pedal pulses are palpable. [de-identified] : AP and lateral x-rays of the left hip, pelvis, and femur were brought in by the patient which I reviewed and demonstrate degenerative joint disease of the hip with joint space narrowing, osteophyte formation, and subchondral sclerosis.\par

## 2022-05-27 NOTE — DISCUSSION/SUMMARY
[de-identified] : This patient is left hip osteoarthritis.  I had a discussion with the patient, who is a candidate for total hip replacement. Risks, benefits and alternatives were discussed. At this point, the patient wants to hold off as the pain is not quite severe enough. This patient understands that the implants that will be used may wear out or loosen over time resulting in failure of the device. Given the patient's relatively young age, this may require one or multiple revision operations in their lifetime. The patient understands this.  Several questions answered about surgery for

## 2022-05-27 NOTE — HISTORY OF PRESENT ILLNESS
[de-identified] : This is very nice 41-year-old gentleman experiencing left hip and groin and thigh pain, which is severe in intensity. The pain substantially limits activities of daily living. Walking tolerance is reduced.  Loss of significant mental weight secondary to bariatric surgery.  Because this he cannot take NSAIDs.  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  The patient denies any radiation of the pain to the feet and it is not associated with numbness, tingling, or weakness.

## 2022-05-31 ENCOUNTER — APPOINTMENT (OUTPATIENT)
Dept: INTERNAL MEDICINE | Facility: CLINIC | Age: 42
End: 2022-05-31
Payer: MEDICAID

## 2022-05-31 ENCOUNTER — NON-APPOINTMENT (OUTPATIENT)
Age: 42
End: 2022-05-31

## 2022-05-31 VITALS
WEIGHT: 189 LBS | OXYGEN SATURATION: 98 % | DIASTOLIC BLOOD PRESSURE: 78 MMHG | HEART RATE: 67 BPM | SYSTOLIC BLOOD PRESSURE: 132 MMHG | HEIGHT: 68 IN | BODY MASS INDEX: 28.64 KG/M2 | TEMPERATURE: 97.8 F

## 2022-05-31 DIAGNOSIS — Z79.890 ENCOUNTER FOR THERAPEUTIC DRUG LVL MONITORING: ICD-10-CM

## 2022-05-31 DIAGNOSIS — Z51.81 ENCOUNTER FOR THERAPEUTIC DRUG LVL MONITORING: ICD-10-CM

## 2022-05-31 PROCEDURE — 99213 OFFICE O/P EST LOW 20 MIN: CPT | Mod: 25

## 2022-05-31 PROCEDURE — 93000 ELECTROCARDIOGRAM COMPLETE: CPT

## 2022-05-31 NOTE — HISTORY OF PRESENT ILLNESS
[FreeTextEntry1] : Patient presents for follow-up.  [de-identified] : Patient is doing well overall. Pt reports he is still on steroids 100 mg once a week.\par Pt has Hip arthritis and has been holding off the surgery until Dec. He has been trying to lose more weight.\par Denies any CP, Chest tightness, SOB, wheezing, coughing or LE edema\par Denies any urinary symptom or change in bowel habits.\par Denies fever, night sweats, or chills\par Denies any urinary symptom or change in bowel habits.\par Has been having troubling sleeping a/w hip pain. \par Asthma has been stable.

## 2022-05-31 NOTE — ASSESSMENT
[FreeTextEntry1] : -Blood work and EKG done today\par -Blood pressure is stable.\par -Continue with lifestyle modification and weight loss.\par -Continue f/u with ortho\par -Asthma has been stable.

## 2022-05-31 NOTE — ADDENDUM
[FreeTextEntry1] : I, Heriberto Marin, acted as a scribe on behalf of Dr. Bc Trujillo MD, on 05/31/2022. \par \par All medical entries made by the scribe were at my, Dr. Bc Trujillo MD, direction and personally dictated by me on 05/31/2022. I have reviewed the chart and agree that the record accurately reflects my personal performance of the history, physical exam, assessment and plan. I have also personally directed, reviewed, and agreed with the chart.

## 2022-06-05 DIAGNOSIS — E53.8 DEFICIENCY OF OTHER SPECIFIED B GROUP VITAMINS: ICD-10-CM

## 2022-06-05 LAB
25(OH)D3 SERPL-MCNC: 29.2 NG/ML
ALBUMIN SERPL ELPH-MCNC: 4.6 G/DL
ALP BLD-CCNC: 93 U/L
ALT SERPL-CCNC: 15 U/L
ANION GAP SERPL CALC-SCNC: 14 MMOL/L
APPEARANCE: CLEAR
AST SERPL-CCNC: 22 U/L
BACTERIA: NEGATIVE
BASOPHILS # BLD AUTO: 0.04 K/UL
BASOPHILS NFR BLD AUTO: 0.6 %
BILIRUB SERPL-MCNC: 0.6 MG/DL
BILIRUBIN URINE: NEGATIVE
BLOOD URINE: NORMAL
BUN SERPL-MCNC: 17 MG/DL
CALCIUM OXALATE CRYSTALS: ABNORMAL
CALCIUM SERPL-MCNC: 9.2 MG/DL
CHLORIDE SERPL-SCNC: 102 MMOL/L
CHOLEST SERPL-MCNC: 172 MG/DL
CO2 SERPL-SCNC: 26 MMOL/L
COLOR: YELLOW
CREAT SERPL-MCNC: 0.84 MG/DL
EGFR: 112 ML/MIN/1.73M2
EOSINOPHIL # BLD AUTO: 0.43 K/UL
EOSINOPHIL NFR BLD AUTO: 6.3 %
ESTIMATED AVERAGE GLUCOSE: 114 MG/DL
ESTRADIOL SERPL-MCNC: 20 PG/ML
FOLATE SERPL-MCNC: 4.4 NG/ML
FSH SERPL-MCNC: <0.3 IU/L
GLUCOSE QUALITATIVE U: NEGATIVE
GLUCOSE SERPL-MCNC: 126 MG/DL
HBA1C MFR BLD HPLC: 5.6 %
HCT VFR BLD CALC: 47 %
HDLC SERPL-MCNC: 52 MG/DL
HGB BLD-MCNC: 15.9 G/DL
HYALINE CASTS: 2 /LPF
IMM GRANULOCYTES NFR BLD AUTO: 0.3 %
KETONES URINE: NORMAL
LDLC SERPL CALC-MCNC: 86 MG/DL
LEUKOCYTE ESTERASE URINE: NEGATIVE
LH SERPL-ACNC: 0.3 IU/L
LYMPHOCYTES # BLD AUTO: 1.78 K/UL
LYMPHOCYTES NFR BLD AUTO: 26.3 %
MAGNESIUM SERPL-MCNC: 2.3 MG/DL
MAN DIFF?: NORMAL
MCHC RBC-ENTMCNC: 31.3 PG
MCHC RBC-ENTMCNC: 33.8 GM/DL
MCV RBC AUTO: 92.5 FL
MICROSCOPIC-UA: NORMAL
MONOCYTES # BLD AUTO: 0.54 K/UL
MONOCYTES NFR BLD AUTO: 8 %
NEUTROPHILS # BLD AUTO: 3.97 K/UL
NEUTROPHILS NFR BLD AUTO: 58.5 %
NITRITE URINE: NEGATIVE
NONHDLC SERPL-MCNC: 120 MG/DL
PH URINE: 6
PHOSPHATE SERPL-MCNC: 3.1 MG/DL
PLATELET # BLD AUTO: 289 K/UL
POTASSIUM SERPL-SCNC: 4.2 MMOL/L
PROT SERPL-MCNC: 7.1 G/DL
PROTEIN URINE: NORMAL
RBC # BLD: 5.08 M/UL
RBC # FLD: 12.7 %
RED BLOOD CELLS URINE: 15 /HPF
SODIUM SERPL-SCNC: 142 MMOL/L
SPECIFIC GRAVITY URINE: 1.02
SQUAMOUS EPITHELIAL CELLS: 0 /HPF
TESTOST FREE SERPL-MCNC: 15.8 PG/ML
TESTOST SERPL-MCNC: 698 NG/DL
TRIGL SERPL-MCNC: 169 MG/DL
TSH SERPL-ACNC: 0.58 UIU/ML
UROBILINOGEN URINE: NORMAL
WBC # FLD AUTO: 6.78 K/UL
WHITE BLOOD CELLS URINE: 1 /HPF

## 2022-06-09 LAB — VIT B2 SERPL-MCNC: 285 UG/L

## 2022-06-10 LAB
VIT B1 SERPL-MCNC: 128.5 NMOL/L
VIT B6 SERPL-MCNC: 50.6 UG/L

## 2022-06-13 LAB
NICOTINAMIDE: 63 NG/ML
NICOTINIC ACID: <5 NG/ML
PANTOTHENATE SERPLBLD-MCNC: 51 NG/ML

## 2022-06-14 LAB — MENADIONE SERPL-MCNC: 0.68 NG/ML

## 2022-07-18 ENCOUNTER — APPOINTMENT (OUTPATIENT)
Dept: UROLOGY | Facility: CLINIC | Age: 42
End: 2022-07-18

## 2022-08-02 ENCOUNTER — EMERGENCY (EMERGENCY)
Facility: HOSPITAL | Age: 42
LOS: 1 days | Discharge: HOME | End: 2022-08-02
Attending: PERSONAL EMERGENCY RESPONSE ATTENDANT
Payer: MEDICAID

## 2022-08-02 VITALS
WEIGHT: 188.94 LBS | SYSTOLIC BLOOD PRESSURE: 126 MMHG | RESPIRATION RATE: 16 BRPM | OXYGEN SATURATION: 98 % | TEMPERATURE: 99 F | HEIGHT: 68 IN | DIASTOLIC BLOOD PRESSURE: 76 MMHG | HEART RATE: 82 BPM

## 2022-08-02 DIAGNOSIS — Z90.3 ACQUIRED ABSENCE OF STOMACH [PART OF]: Chronic | ICD-10-CM

## 2022-08-02 DIAGNOSIS — Z98.89 OTHER SPECIFIED POSTPROCEDURAL STATES: Chronic | ICD-10-CM

## 2022-08-02 PROCEDURE — 73502 X-RAY EXAM HIP UNI 2-3 VIEWS: CPT

## 2022-08-02 PROCEDURE — 99284 EMERGENCY DEPT VISIT MOD MDM: CPT | Mod: 25

## 2022-08-02 PROCEDURE — 99284 EMERGENCY DEPT VISIT MOD MDM: CPT

## 2022-08-02 PROCEDURE — 73552 X-RAY EXAM OF FEMUR 2/>: CPT

## 2022-08-02 PROCEDURE — 73552 X-RAY EXAM OF FEMUR 2/>: CPT | Mod: 26,LT

## 2022-08-02 PROCEDURE — 73502 X-RAY EXAM HIP UNI 2-3 VIEWS: CPT | Mod: 26,LT

## 2022-08-02 NOTE — ED PROVIDER NOTE - OBJECTIVE STATEMENT
42 year old 41 year old healthy male with history of gastric sleeve complaining of left upper thigh pain and swelling after a fall off of a electric scooter 2 weeks ago. At the time he states he did not have any significant pain and he was able to ambulate without any issues. He did notice significant swelling and ecchymosis which has improved but not gone completely. Over the past few days, he has started to have more pain to the area and with use. He works as a  and has continued to be very active over the last 2 weeks. He has tried Tylenol but it has not provided significant relief. The only meds he is currently on are anabolic steroids (testosterone).

## 2022-08-02 NOTE — ED PROVIDER NOTE - PHYSICAL EXAMINATION
GENERAL: Not in acute distress, non-toxic appearing  HEAD: normocephalic, atraumatic  HEENT: EOMI, normal conjunctiva, oral mucosa moist, neck supple  CARDIAC: appears well perfused  PULM: No respiratory difficulty, speaking in full sentences  GI: abdomen nondistended,   NEURO: alert and oriented x 3, normal speech, no focal motor or sensory deficits, no gross neurologic deficit  MSK: + Swelling and tenderness overlying the left trochanteric bursa, both lower extremities neurovascularly intact   SKIN: + ecchymosis to the left upper lateral thigh, dry, well-perfused  PSYCH: appropriate mood and affect

## 2022-08-02 NOTE — ED PROVIDER NOTE - CLINICAL SUMMARY MEDICAL DECISION MAKING FREE TEXT BOX
41 year old healthy active male with left upper thigh pain and swelling after fall 2 weeks ago. Not on any blood thinners. Xray of left hip and femur to rule out fracture as well as hematoma with malignant transformation. based on physical exam this is likely trochanteric bursitis.

## 2022-08-02 NOTE — ED PROVIDER NOTE - NS ED ROS FT
GENERAL: No fever, chills  CARDIAC: Extremities not cold,   PULM: No dyspnea,  GI: No abdominal pain  NEURO: No motor weakness, sensory changes  MSK: + Significant ecchymosis over the upper left thigh, + significant swelling to the left lateral thigh, + tenderness to the left lateral thigh  SKIN: + ecchymosis to the left lateral thigh  HEME: + bruising, no active bleeding,

## 2022-08-02 NOTE — ED PROVIDER NOTE - NSFOLLOWUPINSTRUCTIONS_ED_ALL_ED_FT
You were seen in the ED for your left thigh swelling/pain. An xray of your left hip and your femur were done here in the ED. This is likely Trochanteric Bursitis.     Trochanteric Bursitis is an inflammation of the bursa, a fluid-filled sac located just over the greater trochanter, a bony projection of the femur (hip). Like tennis elbow, this type of inflammation is due to irritation and is not due to infection.    Most patients with trochanteric bursitis respond very well to a combination of local corticosteroid injection, physical therapy, nonsteroidal anti-inflammatory drugs (NSAIDs), and activity restriction.    Because of your Gastric Sleeve, please avoid NSAIDs. Take apply Ice and use Tylenol as well as rest your hip/thigh. Follow up with a Sports Medicine Provider.

## 2022-08-02 NOTE — ED PROVIDER NOTE - NSFOLLOWUPCLINICS_GEN_ALL_ED_FT
[facility]  Sports Medicine  [address] [place] [address]  Phone: [phone]  Fax:
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
## 2022-08-02 NOTE — ED PROVIDER NOTE - PATIENT PORTAL LINK FT
You can access the FollowMyHealth Patient Portal offered by [facility] by registering at the following website: [email or web address]. By joining [employer]’s FollowMyHealth portal, you will also be able to view your health information using other applications (apps) compatible with our system.

## 2022-08-26 ENCOUNTER — APPOINTMENT (OUTPATIENT)
Dept: ORTHOPEDIC SURGERY | Facility: CLINIC | Age: 42
End: 2022-08-26

## 2022-08-26 VITALS — BODY MASS INDEX: 28.04 KG/M2 | HEIGHT: 68 IN | WEIGHT: 185 LBS

## 2022-08-26 PROCEDURE — 73502 X-RAY EXAM HIP UNI 2-3 VIEWS: CPT

## 2022-08-26 PROCEDURE — 99215 OFFICE O/P EST HI 40 MIN: CPT

## 2022-08-26 NOTE — DISCUSSION/SUMMARY
[de-identified] : This patient is left hip osteoarthritis.  He has failed a course of conservative management would like to proceed with a direct anterior approach left total of arthroplasty.\par \par The patient is an appropriate candidate for consideration of left total hip replacement. An extensive discussion was conducted of the natural history of the disease and the variety of surgical and non-surgical treatment options available to the patient. A risk/benefit analysis was discussed with the patient reviewing the advantages and disadvantages of surgical intervention at this time. Both the level and length of the patient's pain have made additional conservative treatment measures consisting of NSAIDs, physical therapy, and/or corticosteroids contraindicated. A full explanation was given of the nature and the purpose of the procedure and anesthesia, its benefits, possible alternative methods of diagnosis or treatment, the risks involved, the possibility of complications, the foreseeable consequences of the procedure and the possible results of the non-treatment. I reviewed the plan of care as well as used a model of a total hip implant equivalent to the one that will be used for their total hip joint replacement. The ability to secure the implant utilizing cement or cementless (press-fit) was discussed with the patient. The patient agrees with the plan of care, as well as the use of implants for their total hip replacement. \par \par No guarantee or assurance was made as to the results that may be obtained. Specifically, the risks were identified to include, but are not limited to the following: Infection, phlebitis, pulmonary embolism, death, paralysis, dislocation, pain, stiffness, instability, limp, weakness, breakage, leg-length inequality, uncontrolled bleeding, nerve injury, blood vessel injury, pressure sores, anesthetic risks, delayed healing of wound and bone, and wear and loosening. Further discussion was undertaken with the patient about the details of surgical preparation, treatment, and postoperative rehabilitation including medical clearance, autotransfusion, the hospital course, and the postoperative rehabilitation involved. All in all, I feel that this patient is a good candidate for surgical reconstruction.\par \par The patient and I discussed the current SARS-CoV-2 (COVID-19) pandemic which has affected our local hospitals. We discussed that our hospitals treat patients with COVID-19. All efforts will be made to avoid cohorting the patient with diagnosed or suspected COVID-19 patient. They also understand that we will screen them 24-48 hours prior to surgery. Despite our best efforts, there is a potential risk for iatrogenic transmission of COVID-19 to the patient during the perioperative period. Lei COVID-19 during the perioperative period may increase the patient´s risks of an adverse outcome including postoperative pneumonia, difficulty breathing, requirement for a breathing tube (general endotracheal intubation), and death. The patient is understanding of this risk, and is willing to proceed with surgery at this time.\par \par The patient and I discussed the option for outpatient joint replacement. They will not stay overnight in the hospital after surgery and will discharge home on the same day of surgery. The risks and benefits of this type of rapid recovery protocol was reviewed with the patient and they are in agreement with proceeding with outpatient joint replacement surgery. They understand that in the event of an emergency, that they will be transferred to the main hospital for inpatient care. \par \par This patient understands that the implants that will be used may wear out or loosen over time resulting in failure of the device. Given the patient's relatively young age, this may require one or multiple revision operations in their lifetime. The patient understands this and is willing to proceed with surgery at this time.

## 2022-08-26 NOTE — PHYSICAL EXAM
[de-identified] : Patient is well nourished, well-developed, in no acute distress, with appropriate mood and affect. The patient is oriented to time, place, and person. Respirations are even and unlabored. Gait evaluation reveals a limp. There is no inguinal adenopathy. Examination of the contralateral hip shows normal range of motion, strength, no tenderness, and intact skin. The affected limb is well-perfused, shows a grossly normal motor and sensory examination. Examination of the hip shows no skin lesions. Hip motion is reduced and causes pain. FADIR is positive and JAMES is positive. Stinchfield test is positive. Leg lengths are approximately equal. Both hips are stable and muscle strength is normal. Pedal pulses are palpable. [de-identified] : AP and lateral x-rays of the left hip, pelvis, and femur were ordered and taken in the office and demonstrate degenerative joint disease of the hip with joint space narrowing, osteophyte formation, and subchondral sclerosis.\par

## 2022-08-26 NOTE — HISTORY OF PRESENT ILLNESS
[de-identified] : This is very nice 41-year-old gentleman experiencing left hip and groin and thigh pain, which is severe in intensity. The pain substantially limits activities of daily living. Walking tolerance is reduced.  Loss of significant amount of weight secondary to bariatric surgery.  Because this he cannot take NSAIDs.  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  The patient denies any radiation of the pain to the feet and it is not associated with numbness, tingling, or weakness.

## 2022-12-21 ENCOUNTER — OUTPATIENT (OUTPATIENT)
Dept: OUTPATIENT SERVICES | Facility: HOSPITAL | Age: 42
LOS: 1 days | End: 2022-12-21
Payer: MEDICAID

## 2022-12-21 VITALS
OXYGEN SATURATION: 97 % | WEIGHT: 197.98 LBS | DIASTOLIC BLOOD PRESSURE: 80 MMHG | HEIGHT: 68 IN | TEMPERATURE: 99 F | SYSTOLIC BLOOD PRESSURE: 126 MMHG | RESPIRATION RATE: 16 BRPM | HEART RATE: 80 BPM

## 2022-12-21 DIAGNOSIS — M16.12 UNILATERAL PRIMARY OSTEOARTHRITIS, LEFT HIP: ICD-10-CM

## 2022-12-21 DIAGNOSIS — Z98.89 OTHER SPECIFIED POSTPROCEDURAL STATES: Chronic | ICD-10-CM

## 2022-12-21 DIAGNOSIS — Z29.9 ENCOUNTER FOR PROPHYLACTIC MEASURES, UNSPECIFIED: ICD-10-CM

## 2022-12-21 DIAGNOSIS — Z01.818 ENCOUNTER FOR OTHER PREPROCEDURAL EXAMINATION: ICD-10-CM

## 2022-12-21 DIAGNOSIS — Z90.3 ACQUIRED ABSENCE OF STOMACH [PART OF]: Chronic | ICD-10-CM

## 2022-12-21 LAB
A1C WITH ESTIMATED AVERAGE GLUCOSE RESULT: 5.2 % — SIGNIFICANT CHANGE UP (ref 4–5.6)
ANION GAP SERPL CALC-SCNC: 12 MMOL/L — SIGNIFICANT CHANGE UP (ref 5–17)
BLD GP AB SCN SERPL QL: NEGATIVE — SIGNIFICANT CHANGE UP
BUN SERPL-MCNC: 15 MG/DL — SIGNIFICANT CHANGE UP (ref 7–23)
CALCIUM SERPL-MCNC: 8.9 MG/DL — SIGNIFICANT CHANGE UP (ref 8.4–10.5)
CHLORIDE SERPL-SCNC: 105 MMOL/L — SIGNIFICANT CHANGE UP (ref 96–108)
CO2 SERPL-SCNC: 25 MMOL/L — SIGNIFICANT CHANGE UP (ref 22–31)
CREAT SERPL-MCNC: 0.94 MG/DL — SIGNIFICANT CHANGE UP (ref 0.5–1.3)
EGFR: 103 ML/MIN/1.73M2 — SIGNIFICANT CHANGE UP
ESTIMATED AVERAGE GLUCOSE: 103 MG/DL — SIGNIFICANT CHANGE UP (ref 68–114)
GLUCOSE SERPL-MCNC: 124 MG/DL — HIGH (ref 70–99)
HCT VFR BLD CALC: 42.6 % — SIGNIFICANT CHANGE UP (ref 39–50)
HGB BLD-MCNC: 14.1 G/DL — SIGNIFICANT CHANGE UP (ref 13–17)
MCHC RBC-ENTMCNC: 31.2 PG — SIGNIFICANT CHANGE UP (ref 27–34)
MCHC RBC-ENTMCNC: 33.1 GM/DL — SIGNIFICANT CHANGE UP (ref 32–36)
MCV RBC AUTO: 94.2 FL — SIGNIFICANT CHANGE UP (ref 80–100)
NRBC # BLD: 0 /100 WBCS — SIGNIFICANT CHANGE UP (ref 0–0)
PLATELET # BLD AUTO: 337 K/UL — SIGNIFICANT CHANGE UP (ref 150–400)
POTASSIUM SERPL-MCNC: 4.2 MMOL/L — SIGNIFICANT CHANGE UP (ref 3.5–5.3)
POTASSIUM SERPL-SCNC: 4.2 MMOL/L — SIGNIFICANT CHANGE UP (ref 3.5–5.3)
RBC # BLD: 4.52 M/UL — SIGNIFICANT CHANGE UP (ref 4.2–5.8)
RBC # FLD: 12.2 % — SIGNIFICANT CHANGE UP (ref 10.3–14.5)
RH IG SCN BLD-IMP: POSITIVE — SIGNIFICANT CHANGE UP
SODIUM SERPL-SCNC: 142 MMOL/L — SIGNIFICANT CHANGE UP (ref 135–145)
WBC # BLD: 7.25 K/UL — SIGNIFICANT CHANGE UP (ref 3.8–10.5)
WBC # FLD AUTO: 7.25 K/UL — SIGNIFICANT CHANGE UP (ref 3.8–10.5)

## 2022-12-21 PROCEDURE — 86900 BLOOD TYPING SEROLOGIC ABO: CPT

## 2022-12-21 PROCEDURE — 83036 HEMOGLOBIN GLYCOSYLATED A1C: CPT

## 2022-12-21 PROCEDURE — 87640 STAPH A DNA AMP PROBE: CPT

## 2022-12-21 PROCEDURE — 80048 BASIC METABOLIC PNL TOTAL CA: CPT

## 2022-12-21 PROCEDURE — 86901 BLOOD TYPING SEROLOGIC RH(D): CPT

## 2022-12-21 PROCEDURE — 85027 COMPLETE CBC AUTOMATED: CPT

## 2022-12-21 PROCEDURE — 86850 RBC ANTIBODY SCREEN: CPT

## 2022-12-21 PROCEDURE — 87641 MR-STAPH DNA AMP PROBE: CPT

## 2022-12-21 PROCEDURE — G0463: CPT

## 2022-12-21 RX ORDER — SODIUM CHLORIDE 9 MG/ML
3 INJECTION INTRAMUSCULAR; INTRAVENOUS; SUBCUTANEOUS EVERY 8 HOURS
Refills: 0 | Status: DISCONTINUED | OUTPATIENT
Start: 2023-01-09 | End: 2023-01-23

## 2022-12-21 RX ORDER — SODIUM CHLORIDE 9 MG/ML
1000 INJECTION, SOLUTION INTRAVENOUS
Refills: 0 | Status: DISCONTINUED | OUTPATIENT
Start: 2023-01-09 | End: 2023-01-23

## 2022-12-21 RX ORDER — LIDOCAINE HCL 20 MG/ML
0.2 VIAL (ML) INJECTION ONCE
Refills: 0 | Status: DISCONTINUED | OUTPATIENT
Start: 2023-01-09 | End: 2023-01-23

## 2022-12-21 NOTE — H&P PST ADULT - HISTORY OF PRESENT ILLNESS
41 yo M with h/o anxiety disorder , stable asthma (denies any exacerbation)c/o left hip , groin & thigh pain x 4 years. Pt had  no relief of pain with conservative management- s/p MRI reveled osteoarthritis left hip. Pt scheduled for left total hip replacement on 1/9/2023  **Pt denies any fever, chills, fall /trauma or sick contacts  **Covid 19 PCR on 1/6/2023 41 yo M with h/o anxiety disorder , stable asthma (denies any exacerbation)c/o left hip , groin & thigh pain x 4 years. Pt had  no relief of pain with conservative management- s/p MRI reveled osteoarthritis left hip. Pt scheduled for left total hip replacement on 1/9/2023  **Pt denies any fever, chills, fall /trauma/injury or sick contacts  **Covid 19 PCR on 1/6/2023

## 2022-12-21 NOTE — H&P PST ADULT - NSICDXPASTMEDICALHX_GEN_ALL_CORE_FT
Medical Necessity Information: It is in your best interest to select a reason for this procedure from the list below. All of these items fulfill various CMS LCD requirements except the new and changing color options. X Size Of Lesion In Cm (Optional): 0 Medical Necessity Clause: Inflamed Billing Type: Third-Party Bill Anesthesia Type: 1% lidocaine without epinephrine Detail Level: Detailed Biopsy Method: Personna blade Post-Care Instructions: I reviewed with the patient in detail post-care instructions. Patient is to keep the biopsy site dry overnight, and then apply bacitracin twice daily until healed. Patient may apply hydrogen peroxide soaks to remove any crusting. Wound Care: Vaseline Bill For Surgical Tray: no Consent was obtained from the patient. The risks and benefits to therapy were discussed in detail. Specifically, the risks of infection, scarring, bleeding, prolonged wound healing, incomplete removal, allergy to anesthesia, nerve injury and recurrence were addressed. Prior to the procedure, the treatment site was clearly identified and confirmed by the patient. All components of Universal Protocol/PAUSE Rule completed. Lab: [id number] Size Of Margin In Cm (Margins Are Not Added To Billing Dimensions): - Path Notes (To The Dermatopathologist): Please check margins. Hemostasis: Electrocautery Anesthesia Volume In Cc: 0.8 Notification Instructions: Patient will be notified of biopsy results. However, patient instructed to call the office if not contacted within 2 weeks. Size Of Lesion In Cm (Required): 0.6 PAST MEDICAL HISTORY:  Anxiety, mild     Asthma     OA (osteoarthritis)

## 2022-12-21 NOTE — H&P PST ADULT - NSICDXFAMILYHX_GEN_ALL_CORE_FT
FAMILY HISTORY:  Mother  Still living? Yes, Estimated age: 61-70  FH: type 2 diabetes, Age at diagnosis: Age Unknown

## 2022-12-21 NOTE — H&P PST ADULT - NSANTHSNORERD_ENT_A_CORE
Alar Island Pedicle Flap Text: The defect edges were debeveled with a #15 scalpel blade.  Given the location of the defect, shape of the defect and the proximity to the alar rim an island pedicle advancement flap was deemed most appropriate.  Using a sterile surgical marker, an appropriate advancement flap was drawn incorporating the defect, outlining the appropriate donor tissue and placing the expected incisions within the nasal ala running parallel to the alar rim. The area thus outlined was incised with a #15 scalpel blade.  The skin margins were undermined minimally to an appropriate distance in all directions around the primary defect and laterally outward around the island pedicle utilizing iris scissors.  There was minimal undermining beneath the pedicle flap. No

## 2022-12-21 NOTE — H&P PST ADULT - PROBLEM SELECTOR PLAN 1
Left total hip arthroplasty  Labs- CBC, BMP, Hb A1C, MRSA/MSSA, T&S  Pre op instructions discussed  Pre op medical evaluation

## 2022-12-21 NOTE — H&P PST ADULT - ASSESSMENT
CAPRINI SCORE [CLOT updated 18]    AGE RELATED RISK FACTORS                                                       MOBILITY RELATED FACTORS  [x ] Age 41-60 years                                            (1 Point)                    [ ] Bed rest                                                        (1 Point)  [ ] Age: 61-74 years                                           (2 Points)                  [ ] Plaster cast                                                   (2 Points)  [ ] Age= 75 years                                              (3 Points)                    [ ] Bed bound for more than 72 hours                 (2 Points)    DISEASE RELATED RISK FACTORS                                               GENDER SPECIFIC FACTORS  [ ] Edema in the lower extremities                       (1 Point)              [ ] Pregnancy                                                     (1 Point)  [ ] Varicose veins                                               (1 Point)                     [ ] Post-partum < 6 weeks                                   (1 Point)             [x ] BMI > 25 Kg/m2                                            (1 Point)                     [ ] Hormonal therapy  or oral contraception          (1 Point)                 [ ] Sepsis (in the previous month)                        (1 Point)               [ ] History of pregnancy complications                 (1 point)  [ ] Pneumonia or serious lung disease                                               [ ] Unexplained or recurrent                     (1 Point)           (in the previous month)                               (1 Point)  [ ] Abnormal pulmonary function test                     (1 Point)                 SURGERY RELATED RISK FACTORS  [ ] Acute myocardial infarction                              (1 Point)               [ ]  Section                                             (1 Point)  [ ] Congestive heart failure (in the previous month)  (1 Point)      [ ] Minor surgery                                                  (1 Point)   [ ] Inflammatory bowel disease                             (1 Point)               [ ] Arthroscopic surgery                                        (2 Points)  [ ] Central venous access                                      (2 Points)                [ ] General surgery lasting more than 45 minutes (2 points)  [ ] Present or previous malignancy                     (2 Points)                [x ] Elective arthroplasty                                         (5 points)    [ ] Stroke (in the previous month)                          (5 Points)                                                                                                                                                           HEMATOLOGY RELATED FACTORS                                                 TRAUMA RELATED RISK FACTORS  [ ] Prior episodes of VTE                                     (3 Points)                [ ] Fracture of the hip, pelvis, or leg                       (5 Points)  [ ] Positive family history for VTE                         (3 Points)             [ ] Acute spinal cord injury (in the previous month)  (5 Points)  [ ] Prothrombin 42006 A                                     (3 Points)               [ ] Paralysis  (less than 1 month)                             (5 Points)  [ ] Factor V Leiden                                             (3 Points)                  [ ] Multiple Trauma within 1 month                        (5 Points)  [ ] Lupus anticoagulants                                     (3 Points)                                                           [ ] Anticardiolipin antibodies                               (3 Points)                                                       [ ] High homocysteine in the blood                      (3 Points)                                             [ ] Other congenital or acquired thrombophilia      (3 Points)                                                [ ] Heparin induced thrombocytopenia                  (3 Points)                                     Total Score [   7       ]

## 2022-12-22 LAB
MRSA PCR RESULT.: SIGNIFICANT CHANGE UP
S AUREUS DNA NOSE QL NAA+PROBE: SIGNIFICANT CHANGE UP

## 2022-12-30 ENCOUNTER — APPOINTMENT (OUTPATIENT)
Dept: INTERNAL MEDICINE | Facility: CLINIC | Age: 42
End: 2022-12-30
Payer: MEDICAID

## 2022-12-30 ENCOUNTER — NON-APPOINTMENT (OUTPATIENT)
Age: 42
End: 2022-12-30

## 2022-12-30 VITALS — BODY MASS INDEX: 29.57 KG/M2 | WEIGHT: 195.11 LBS | HEIGHT: 68 IN

## 2022-12-30 VITALS — DIASTOLIC BLOOD PRESSURE: 74 MMHG | SYSTOLIC BLOOD PRESSURE: 126 MMHG | OXYGEN SATURATION: 98 % | HEART RATE: 73 BPM

## 2022-12-30 PROCEDURE — 99213 OFFICE O/P EST LOW 20 MIN: CPT

## 2022-12-30 RX ORDER — FOLIC ACID 1 MG/1
1 TABLET ORAL
Qty: 90 | Refills: 3 | Status: ACTIVE | COMMUNITY
Start: 2022-06-05 | End: 1900-01-01

## 2022-12-30 NOTE — HISTORY OF PRESENT ILLNESS
[No Pertinent Cardiac History] : no history of aortic stenosis, atrial fibrillation, coronary artery disease, recent myocardial infarction, or implantable device/pacemaker [Asthma] : asthma [No Adverse Anesthesia Reaction] : no adverse anesthesia reaction in self or family member [(Patient denies any chest pain, claudication, dyspnea on exertion, orthopnea, palpitations or syncope)] : Patient denies any chest pain, claudication, dyspnea on exertion, orthopnea, palpitations or syncope [Chronic Anticoagulation] : no chronic anticoagulation [Chronic Kidney Disease] : no chronic kidney disease [Diabetes] : no diabetes [Excellent (>10 METs)] : Excellent (>10 METs) [FreeTextEntry1] : L hip replacement [FreeTextEntry2] : 1/09/23 [FreeTextEntry3] : Dr. Otero [FreeTextEntry4] : Pt is doing well overall. Able to walk two flights of stairs without getting SOB.\par Denies any CP, Chest tightness, coughing or wheezing.\par Denies any ASA or blood thinner medication.\par Denies any palpitations, lightheadedness or dizziness.\par Pt requesting alprazolam refill

## 2022-12-30 NOTE — ASSESSMENT
[Patient Optimized for Surgery] : Patient optimized for surgery [No Further Testing Recommended] : no further testing recommended [FreeTextEntry4] : -EKG done today which is unremarkable.\par -Medication refills provided.

## 2022-12-30 NOTE — ADDENDUM
[FreeTextEntry1] : I, Heriberto Marin, acted as a scribe on behalf of Dr. Bc Trujillo MD, on 12/30/2022. \par \par All medical entries made by the scribe were at my, Dr. Bc Trujillo MD, direction and personally dictated by me on 12/30/2022. I have reviewed the chart and agree that the record accurately reflects my personal performance of the history, physical exam, assessment and plan. I have also personally directed, reviewed, and agreed with the chart.

## 2023-01-08 ENCOUNTER — TRANSCRIPTION ENCOUNTER (OUTPATIENT)
Age: 43
End: 2023-01-08

## 2023-01-09 ENCOUNTER — OUTPATIENT (OUTPATIENT)
Dept: OUTPATIENT SERVICES | Facility: HOSPITAL | Age: 43
LOS: 1 days | End: 2023-01-09
Payer: MEDICAID

## 2023-01-09 ENCOUNTER — APPOINTMENT (OUTPATIENT)
Dept: ORTHOPEDIC SURGERY | Facility: HOSPITAL | Age: 43
End: 2023-01-09

## 2023-01-09 ENCOUNTER — TRANSCRIPTION ENCOUNTER (OUTPATIENT)
Age: 43
End: 2023-01-09

## 2023-01-09 ENCOUNTER — RESULT REVIEW (OUTPATIENT)
Age: 43
End: 2023-01-09

## 2023-01-09 VITALS
OXYGEN SATURATION: 97 % | TEMPERATURE: 98 F | HEIGHT: 68 IN | HEART RATE: 55 BPM | SYSTOLIC BLOOD PRESSURE: 120 MMHG | DIASTOLIC BLOOD PRESSURE: 74 MMHG | RESPIRATION RATE: 18 BRPM | WEIGHT: 197.98 LBS

## 2023-01-09 VITALS — OXYGEN SATURATION: 98 % | RESPIRATION RATE: 15 BRPM | HEART RATE: 60 BPM

## 2023-01-09 DIAGNOSIS — Z98.89 OTHER SPECIFIED POSTPROCEDURAL STATES: Chronic | ICD-10-CM

## 2023-01-09 DIAGNOSIS — Z90.3 ACQUIRED ABSENCE OF STOMACH [PART OF]: Chronic | ICD-10-CM

## 2023-01-09 DIAGNOSIS — M16.12 UNILATERAL PRIMARY OSTEOARTHRITIS, LEFT HIP: ICD-10-CM

## 2023-01-09 LAB
ANION GAP SERPL CALC-SCNC: 10 MMOL/L — SIGNIFICANT CHANGE UP (ref 5–17)
BUN SERPL-MCNC: 12 MG/DL — SIGNIFICANT CHANGE UP (ref 7–23)
CALCIUM SERPL-MCNC: 8.5 MG/DL — SIGNIFICANT CHANGE UP (ref 8.4–10.5)
CHLORIDE SERPL-SCNC: 105 MMOL/L — SIGNIFICANT CHANGE UP (ref 96–108)
CO2 SERPL-SCNC: 25 MMOL/L — SIGNIFICANT CHANGE UP (ref 22–31)
CREAT SERPL-MCNC: 0.67 MG/DL — SIGNIFICANT CHANGE UP (ref 0.5–1.3)
EGFR: 119 ML/MIN/1.73M2 — SIGNIFICANT CHANGE UP
GLUCOSE SERPL-MCNC: 110 MG/DL — HIGH (ref 70–99)
HCT VFR BLD CALC: 35.6 % — LOW (ref 39–50)
HGB BLD-MCNC: 12.1 G/DL — LOW (ref 13–17)
MCHC RBC-ENTMCNC: 31.5 PG — SIGNIFICANT CHANGE UP (ref 27–34)
MCHC RBC-ENTMCNC: 34 GM/DL — SIGNIFICANT CHANGE UP (ref 32–36)
MCV RBC AUTO: 92.7 FL — SIGNIFICANT CHANGE UP (ref 80–100)
NRBC # BLD: 0 /100 WBCS — SIGNIFICANT CHANGE UP (ref 0–0)
PLATELET # BLD AUTO: 207 K/UL — SIGNIFICANT CHANGE UP (ref 150–400)
POTASSIUM SERPL-MCNC: 4.3 MMOL/L — SIGNIFICANT CHANGE UP (ref 3.5–5.3)
POTASSIUM SERPL-SCNC: 4.3 MMOL/L — SIGNIFICANT CHANGE UP (ref 3.5–5.3)
RBC # BLD: 3.84 M/UL — LOW (ref 4.2–5.8)
RBC # FLD: 11.9 % — SIGNIFICANT CHANGE UP (ref 10.3–14.5)
SODIUM SERPL-SCNC: 140 MMOL/L — SIGNIFICANT CHANGE UP (ref 135–145)
WBC # BLD: 5.67 K/UL — SIGNIFICANT CHANGE UP (ref 3.8–10.5)
WBC # FLD AUTO: 5.67 K/UL — SIGNIFICANT CHANGE UP (ref 3.8–10.5)

## 2023-01-09 PROCEDURE — 97165 OT EVAL LOW COMPLEX 30 MIN: CPT

## 2023-01-09 PROCEDURE — 97161 PT EVAL LOW COMPLEX 20 MIN: CPT

## 2023-01-09 PROCEDURE — 80048 BASIC METABOLIC PNL TOTAL CA: CPT

## 2023-01-09 PROCEDURE — 36415 COLL VENOUS BLD VENIPUNCTURE: CPT

## 2023-01-09 PROCEDURE — 27130 TOTAL HIP ARTHROPLASTY: CPT | Mod: LT

## 2023-01-09 PROCEDURE — 72170 X-RAY EXAM OF PELVIS: CPT | Mod: 26

## 2023-01-09 PROCEDURE — C1776: CPT

## 2023-01-09 PROCEDURE — 27130 TOTAL HIP ARTHROPLASTY: CPT | Mod: 82,LT

## 2023-01-09 PROCEDURE — 72170 X-RAY EXAM OF PELVIS: CPT

## 2023-01-09 PROCEDURE — 86900 BLOOD TYPING SEROLOGIC ABO: CPT

## 2023-01-09 PROCEDURE — 85027 COMPLETE CBC AUTOMATED: CPT

## 2023-01-09 PROCEDURE — 82962 GLUCOSE BLOOD TEST: CPT

## 2023-01-09 PROCEDURE — 86850 RBC ANTIBODY SCREEN: CPT

## 2023-01-09 PROCEDURE — 86901 BLOOD TYPING SEROLOGIC RH(D): CPT

## 2023-01-09 DEVICE — HEAD DELTA CER V40 36 PLUS 5: Type: IMPLANTABLE DEVICE | Site: LEFT | Status: FUNCTIONAL

## 2023-01-09 DEVICE — LINER ACET TRIDENT X3 0 DEG 36MM E: Type: IMPLANTABLE DEVICE | Site: LEFT | Status: FUNCTIONAL

## 2023-01-09 DEVICE — NECK ANGLE HIP STEM: Type: IMPLANTABLE DEVICE | Site: LEFT | Status: FUNCTIONAL

## 2023-01-09 DEVICE — SHELL ACET TRIDENT II E 54MM: Type: IMPLANTABLE DEVICE | Site: LEFT | Status: FUNCTIONAL

## 2023-01-09 RX ORDER — SENNA PLUS 8.6 MG/1
2 TABLET ORAL AT BEDTIME
Refills: 0 | Status: DISCONTINUED | OUTPATIENT
Start: 2023-01-09 | End: 2023-01-23

## 2023-01-09 RX ORDER — DEXAMETHASONE 0.5 MG/5ML
8 ELIXIR ORAL ONCE
Refills: 0 | Status: DISCONTINUED | OUTPATIENT
Start: 2023-01-10 | End: 2023-01-23

## 2023-01-09 RX ORDER — SENNA PLUS 8.6 MG/1
2 TABLET ORAL
Qty: 0 | Refills: 0 | DISCHARGE
Start: 2023-01-09

## 2023-01-09 RX ORDER — ACETAMINOPHEN 500 MG
1000 TABLET ORAL ONCE
Refills: 0 | Status: DISCONTINUED | OUTPATIENT
Start: 2023-01-09 | End: 2023-01-09

## 2023-01-09 RX ORDER — ACETAMINOPHEN 500 MG
1000 TABLET ORAL ONCE
Refills: 0 | Status: DISCONTINUED | OUTPATIENT
Start: 2023-01-10 | End: 2023-01-23

## 2023-01-09 RX ORDER — CHLORHEXIDINE GLUCONATE 213 G/1000ML
1 SOLUTION TOPICAL ONCE
Refills: 0 | Status: COMPLETED | OUTPATIENT
Start: 2023-01-09 | End: 2023-01-09

## 2023-01-09 RX ORDER — OXYCODONE HYDROCHLORIDE 5 MG/1
10 TABLET ORAL EVERY 4 HOURS
Refills: 0 | Status: DISCONTINUED | OUTPATIENT
Start: 2023-01-09 | End: 2023-01-09

## 2023-01-09 RX ORDER — ACETAMINOPHEN 500 MG
1000 TABLET ORAL ONCE
Refills: 0 | Status: DISCONTINUED | OUTPATIENT
Start: 2023-01-09 | End: 2023-01-23

## 2023-01-09 RX ORDER — CEFAZOLIN SODIUM 1 G
2000 VIAL (EA) INJECTION ONCE
Refills: 0 | Status: COMPLETED | OUTPATIENT
Start: 2023-01-09 | End: 2023-01-09

## 2023-01-09 RX ORDER — OXYCODONE HYDROCHLORIDE 5 MG/1
1 TABLET ORAL
Qty: 0 | Refills: 0 | DISCHARGE

## 2023-01-09 RX ORDER — TRAMADOL HYDROCHLORIDE 50 MG/1
50 TABLET ORAL ONCE
Refills: 0 | Status: DISCONTINUED | OUTPATIENT
Start: 2023-01-09 | End: 2023-01-09

## 2023-01-09 RX ORDER — ASPIRIN/CALCIUM CARB/MAGNESIUM 324 MG
81 TABLET ORAL
Refills: 0 | Status: DISCONTINUED | OUTPATIENT
Start: 2023-01-09 | End: 2023-01-23

## 2023-01-09 RX ORDER — POLYETHYLENE GLYCOL 3350 17 G/17G
17 POWDER, FOR SOLUTION ORAL AT BEDTIME
Refills: 0 | Status: DISCONTINUED | OUTPATIENT
Start: 2023-01-09 | End: 2023-01-23

## 2023-01-09 RX ORDER — MAGNESIUM HYDROXIDE 400 MG/1
30 TABLET, CHEWABLE ORAL DAILY
Refills: 0 | Status: DISCONTINUED | OUTPATIENT
Start: 2023-01-09 | End: 2023-01-23

## 2023-01-09 RX ORDER — KETOROLAC TROMETHAMINE 30 MG/ML
15 SYRINGE (ML) INJECTION EVERY 6 HOURS
Refills: 0 | Status: COMPLETED | OUTPATIENT
Start: 2023-01-09 | End: 2023-01-10

## 2023-01-09 RX ORDER — OXYCODONE HYDROCHLORIDE 5 MG/1
5 TABLET ORAL EVERY 4 HOURS
Refills: 0 | Status: DISCONTINUED | OUTPATIENT
Start: 2023-01-09 | End: 2023-01-09

## 2023-01-09 RX ORDER — ASPIRIN/CALCIUM CARB/MAGNESIUM 324 MG
1 TABLET ORAL
Qty: 0 | Refills: 0 | DISCHARGE

## 2023-01-09 RX ORDER — TRAMADOL HYDROCHLORIDE 50 MG/1
50 TABLET ORAL EVERY 6 HOURS
Refills: 0 | Status: DISCONTINUED | OUTPATIENT
Start: 2023-01-09 | End: 2023-01-09

## 2023-01-09 RX ORDER — CEFAZOLIN SODIUM 1 G
2000 VIAL (EA) INJECTION EVERY 8 HOURS
Refills: 0 | Status: COMPLETED | OUTPATIENT
Start: 2023-01-09 | End: 2023-01-09

## 2023-01-09 RX ORDER — SODIUM CHLORIDE 9 MG/ML
500 INJECTION INTRAMUSCULAR; INTRAVENOUS; SUBCUTANEOUS ONCE
Refills: 0 | Status: DISCONTINUED | OUTPATIENT
Start: 2023-01-09 | End: 2023-01-23

## 2023-01-09 RX ORDER — TRAMADOL HYDROCHLORIDE 50 MG/1
1 TABLET ORAL
Qty: 0 | Refills: 0 | DISCHARGE

## 2023-01-09 RX ORDER — ALBUTEROL 90 UG/1
2 AEROSOL, METERED ORAL EVERY 6 HOURS
Refills: 0 | Status: DISCONTINUED | OUTPATIENT
Start: 2023-01-09 | End: 2023-01-23

## 2023-01-09 RX ORDER — PANTOPRAZOLE SODIUM 20 MG/1
1 TABLET, DELAYED RELEASE ORAL
Qty: 0 | Refills: 0 | DISCHARGE

## 2023-01-09 RX ORDER — ACETAMINOPHEN 500 MG
3 TABLET ORAL
Qty: 0 | Refills: 0 | DISCHARGE

## 2023-01-09 RX ORDER — PANTOPRAZOLE SODIUM 20 MG/1
40 TABLET, DELAYED RELEASE ORAL ONCE
Refills: 0 | Status: COMPLETED | OUTPATIENT
Start: 2023-01-09 | End: 2023-01-09

## 2023-01-09 RX ORDER — PANTOPRAZOLE SODIUM 20 MG/1
40 TABLET, DELAYED RELEASE ORAL
Refills: 0 | Status: DISCONTINUED | OUTPATIENT
Start: 2023-01-09 | End: 2023-01-23

## 2023-01-09 RX ORDER — SODIUM CHLORIDE 9 MG/ML
500 INJECTION INTRAMUSCULAR; INTRAVENOUS; SUBCUTANEOUS ONCE
Refills: 0 | Status: COMPLETED | OUTPATIENT
Start: 2023-01-09 | End: 2023-01-09

## 2023-01-09 RX ORDER — ONDANSETRON 8 MG/1
4 TABLET, FILM COATED ORAL EVERY 6 HOURS
Refills: 0 | Status: DISCONTINUED | OUTPATIENT
Start: 2023-01-09 | End: 2023-01-23

## 2023-01-09 RX ADMIN — Medication 15 MILLIGRAM(S): at 16:30

## 2023-01-09 RX ADMIN — OXYCODONE HYDROCHLORIDE 5 MILLIGRAM(S): 5 TABLET ORAL at 11:56

## 2023-01-09 RX ADMIN — TRAMADOL HYDROCHLORIDE 50 MILLIGRAM(S): 50 TABLET ORAL at 07:20

## 2023-01-09 RX ADMIN — SODIUM CHLORIDE 500 MILLILITER(S): 9 INJECTION INTRAMUSCULAR; INTRAVENOUS; SUBCUTANEOUS at 16:00

## 2023-01-09 RX ADMIN — Medication 1 TABLET(S): at 13:33

## 2023-01-09 RX ADMIN — PANTOPRAZOLE SODIUM 40 MILLIGRAM(S): 20 TABLET, DELAYED RELEASE ORAL at 07:20

## 2023-01-09 RX ADMIN — Medication 100 MILLIGRAM(S): at 15:20

## 2023-01-09 RX ADMIN — CHLORHEXIDINE GLUCONATE 1 APPLICATION(S): 213 SOLUTION TOPICAL at 07:21

## 2023-01-09 RX ADMIN — SODIUM CHLORIDE 100 MILLILITER(S): 9 INJECTION, SOLUTION INTRAVENOUS at 07:19

## 2023-01-09 RX ADMIN — Medication 15 MILLIGRAM(S): at 15:53

## 2023-01-09 NOTE — ASU DISCHARGE PLAN (ADULT/PEDIATRIC) - NURSING INSTRUCTIONS
Mortin given today at 4pm next dose can be around 10pm, All other medication were given over 4 hours ago so you can take them when needed

## 2023-01-09 NOTE — ASU PREOP CHECKLIST - LOOSE TEETH
DIAGNOSIS: right shoulder pain.self, imaging done @ [facility]   APPOINTMENT DATE: 12.23.22   NOTES STATUS DETAILS   OFFICE NOTE from other specialist Internal Internal:  8.5.13 Jill Fernandes MD    HP:  12.16.22 Nirali Barbour MD     MEDICATION LIST Internal    XRAYS (IMAGES & REPORTS) PACS Internal:  8.5.13 R shoulder    HP:  12.16.22 R shoulder  3.11.04 R shoulder--old images        Action December 20, 2022 1:09 PM    Action Taken Faxed request xray images to . Fax [phone]     12.22.22 10:07 AM -- received images from  and resolved in pacs.             no

## 2023-01-09 NOTE — ASU PATIENT PROFILE, ADULT - FALL HARM RISK - UNIVERSAL INTERVENTIONS
Bed in lowest position, wheels locked, appropriate side rails in place/Call bell, personal items and telephone in reach/Instruct patient to call for assistance before getting out of bed or chair/Non-slip footwear when patient is out of bed/[place] to call system/Physically safe environment - no spills, clutter or unnecessary equipment/Purposeful Proactive Rounding/Room/bathroom lighting operational, light cord in reach

## 2023-01-09 NOTE — PHYSICAL THERAPY INITIAL EVALUATION ADULT - LEVEL OF INDEPENDENCE: GAIT, REHAB EVAL
independent Thalidomide Pregnancy And Lactation Text: This medication is Pregnancy Category X and is absolutely contraindicated during pregnancy. It is unknown if it is excreted in breast milk.

## 2023-01-09 NOTE — ASU DISCHARGE PLAN (ADULT/PEDIATRIC) - CARE PROVIDER_API CALL
Ishan Otero)  Orthopaedic Surgery  [address], [address]  [place] [address]  Phone: [phone]  Fax: [phone]  Follow Up Time:

## 2023-01-09 NOTE — PHYSICAL THERAPY INITIAL EVALUATION ADULT - PERTINENT HX OF CURRENT PROBLEM, REHAB EVAL
42 y.o. M PMH anxiety disorder, stable asthma (denies any exacerbation) c/o left hip, groin & thigh pain x 4 years. Pt had no relief of pain with conservative management- s/p MRI reveled osteoarthritis left hip. Now s/p L total hip replacement, anterior approach, on 1/9/23.

## 2023-01-09 NOTE — ASU DISCHARGE PLAN (ADULT/PEDIATRIC) - NS MD DC FALL RISK RISK
For information on Fall & Injury Prevention, visit: [email or web address] OR  [email or web address] OR  https://www.cdc.gov/steadi/patient.html

## 2023-01-09 NOTE — OCCUPATIONAL THERAPY INITIAL EVALUATION ADULT - PERTINENT HX OF CURRENT PROBLEM, REHAB EVAL
41 yo M with h/o anxiety disorder , stable asthma (denies any exacerbation)c/o left hip , groin & thigh pain x 4 years. Pt had  no relief of pain with conservative management- s/p MRI reveled osteoarthritis left hip. Pt s/p left total hip replacement on 1/9/2023

## 2023-01-09 NOTE — ASU DISCHARGE PLAN (ADULT/PEDIATRIC) - ASU DC SPECIAL INSTRUCTIONSFT
Please follow up with Dr. Otero at your scheduled follow up appointment in 2 weeks (Call office to confirm appointment).  PT-weight bearing as tolerated.  Aspirin 81mg twice daily x 4 weeks total for dvt prevention.  Keep dressing clean, dry and intact until date listed on dressing.  Have doctor remove any sutures (if applicable) at follow up visit.      Please follow up with your PMD within 1 month for routine checkup.

## 2023-01-09 NOTE — PHYSICAL THERAPY INITIAL EVALUATION ADULT - ADDITIONAL COMMENTS
Pt resides w/ family in the second floor of a 2 story home. 5 steps to enter, + 20 steps to second floor, +HR. PTA pt was independent w/ all functional mobility & did not use an AD for ambulation.

## 2023-01-24 ENCOUNTER — APPOINTMENT (OUTPATIENT)
Dept: ORTHOPEDIC SURGERY | Facility: CLINIC | Age: 43
End: 2023-01-24
Payer: MEDICAID

## 2023-01-24 VITALS — WEIGHT: 195 LBS | HEIGHT: 68 IN | BODY MASS INDEX: 29.55 KG/M2 | OXYGEN SATURATION: 99 % | TEMPERATURE: 97.3 F

## 2023-01-24 PROBLEM — F41.9 ANXIETY DISORDER, UNSPECIFIED: Chronic | Status: ACTIVE | Noted: 2022-12-21

## 2023-01-24 PROBLEM — M19.90 UNSPECIFIED OSTEOARTHRITIS, UNSPECIFIED SITE: Chronic | Status: ACTIVE | Noted: 2022-12-21

## 2023-01-24 PROCEDURE — 99024 POSTOP FOLLOW-UP VISIT: CPT

## 2023-01-24 PROCEDURE — 73502 X-RAY EXAM HIP UNI 2-3 VIEWS: CPT

## 2023-01-24 NOTE — HISTORY OF PRESENT ILLNESS
[de-identified] : Status-post left total hip  arthroplasty here for initial postoperative evaluation. Excellent progress is noted in terms of pain and restoration of function. Pain is well controlled with oral medications. There has been no change in medical health since discharge. The patient does require assistive devices.

## 2023-01-24 NOTE — PHYSICAL EXAM
[de-identified] : Well developed, well nourished in no apparent distress, awake, alert and orientated to person, place and time with appropriate mood and affect\par Respirations are even and unlabored. Gait evaluation does not reveal a limp. There is no inguinal adenopathy. The affected limb is well-perfused with palpable pedal pulse, without skin lesions, shows a grossly normal motor and sensory examination. Incision is healed Hip motion is full and painless throughout ROM. Leg lengths are approximately equal  [de-identified] : AP pelvis, AP hip, and lateral x-rays of the left hip were ordered and obtained in the office and demonstrate satisfactory position and alignment of the components are present. No signs of loosening are seen.

## 2023-01-24 NOTE — DISCUSSION/SUMMARY
[de-identified] : The patient is doing well after joint replacement surgery. Written infectious precautions were reviewed. The patient will progress with physical therapy at this time and they will work on transitioning from requiring assistive devices for ambulation. Anti-coagulant therapy will be discontinued at 1 month post surgery for the purpose of orthopedic thromboembolism prophylaxis. Return around the 6 week anniversary from surgery for follow-up evaluation.

## 2023-02-20 ENCOUNTER — EMERGENCY (EMERGENCY)
Facility: HOSPITAL | Age: 43
LOS: 1 days | Discharge: HOME | End: 2023-02-20
Attending: STUDENT IN AN ORGANIZED HEALTH CARE EDUCATION/TRAINING PROGRAM
Payer: MEDICAID

## 2023-02-20 VITALS
SYSTOLIC BLOOD PRESSURE: 137 MMHG | RESPIRATION RATE: 24 BRPM | OXYGEN SATURATION: 98 % | HEART RATE: 98 BPM | DIASTOLIC BLOOD PRESSURE: 75 MMHG | HEIGHT: 68 IN | WEIGHT: 184.97 LBS | TEMPERATURE: 98 F

## 2023-02-20 DIAGNOSIS — Z90.3 ACQUIRED ABSENCE OF STOMACH [PART OF]: Chronic | ICD-10-CM

## 2023-02-20 DIAGNOSIS — Z98.89 OTHER SPECIFIED POSTPROCEDURAL STATES: Chronic | ICD-10-CM

## 2023-02-20 LAB
ALBUMIN SERPL ELPH-MCNC: 4.8 G/DL — SIGNIFICANT CHANGE UP (ref 3.3–5)
ALP SERPL-CCNC: 100 U/L — SIGNIFICANT CHANGE UP (ref 40–120)
ALT FLD-CCNC: 16 U/L — SIGNIFICANT CHANGE UP (ref 10–45)
ANION GAP SERPL CALC-SCNC: 15 MMOL/L — SIGNIFICANT CHANGE UP (ref 5–17)
AST SERPL-CCNC: 27 U/L — SIGNIFICANT CHANGE UP (ref 10–40)
BASOPHILS # BLD AUTO: 0.03 K/UL — SIGNIFICANT CHANGE UP (ref 0–0.2)
BASOPHILS NFR BLD AUTO: 0.3 % — SIGNIFICANT CHANGE UP (ref 0–2)
BILIRUB SERPL-MCNC: 0.9 MG/DL — SIGNIFICANT CHANGE UP (ref 0.2–1.2)
BUN SERPL-MCNC: 16 MG/DL — SIGNIFICANT CHANGE UP (ref 7–23)
CALCIUM SERPL-MCNC: 9.5 MG/DL — SIGNIFICANT CHANGE UP (ref 8.4–10.5)
CHLORIDE SERPL-SCNC: 103 MMOL/L — SIGNIFICANT CHANGE UP (ref 96–108)
CO2 SERPL-SCNC: 25 MMOL/L — SIGNIFICANT CHANGE UP (ref 22–31)
CREAT SERPL-MCNC: 0.95 MG/DL — SIGNIFICANT CHANGE UP (ref 0.5–1.3)
EGFR: 102 ML/MIN/1.73M2 — SIGNIFICANT CHANGE UP
EOSINOPHIL # BLD AUTO: 0.03 K/UL — SIGNIFICANT CHANGE UP (ref 0–0.5)
EOSINOPHIL NFR BLD AUTO: 0.3 % — SIGNIFICANT CHANGE UP (ref 0–6)
GLUCOSE SERPL-MCNC: 94 MG/DL — SIGNIFICANT CHANGE UP (ref 70–99)
HCT VFR BLD CALC: 41.3 % — SIGNIFICANT CHANGE UP (ref 39–50)
HGB BLD-MCNC: 14.1 G/DL — SIGNIFICANT CHANGE UP (ref 13–17)
IMM GRANULOCYTES NFR BLD AUTO: 0.4 % — SIGNIFICANT CHANGE UP (ref 0–0.9)
LYMPHOCYTES # BLD AUTO: 1.79 K/UL — SIGNIFICANT CHANGE UP (ref 1–3.3)
LYMPHOCYTES # BLD AUTO: 17.5 % — SIGNIFICANT CHANGE UP (ref 13–44)
MCHC RBC-ENTMCNC: 30.9 PG — SIGNIFICANT CHANGE UP (ref 27–34)
MCHC RBC-ENTMCNC: 34.1 GM/DL — SIGNIFICANT CHANGE UP (ref 32–36)
MCV RBC AUTO: 90.6 FL — SIGNIFICANT CHANGE UP (ref 80–100)
MONOCYTES # BLD AUTO: 0.68 K/UL — SIGNIFICANT CHANGE UP (ref 0–0.9)
MONOCYTES NFR BLD AUTO: 6.7 % — SIGNIFICANT CHANGE UP (ref 2–14)
NEUTROPHILS # BLD AUTO: 7.64 K/UL — HIGH (ref 1.8–7.4)
NEUTROPHILS NFR BLD AUTO: 74.8 % — SIGNIFICANT CHANGE UP (ref 43–77)
NRBC # BLD: 0 /100 WBCS — SIGNIFICANT CHANGE UP (ref 0–0)
PLATELET # BLD AUTO: 309 K/UL — SIGNIFICANT CHANGE UP (ref 150–400)
POTASSIUM SERPL-MCNC: 3.3 MMOL/L — LOW (ref 3.5–5.3)
POTASSIUM SERPL-SCNC: 3.3 MMOL/L — LOW (ref 3.5–5.3)
PROT SERPL-MCNC: 8 G/DL — SIGNIFICANT CHANGE UP (ref 6–8.3)
RBC # BLD: 4.56 M/UL — SIGNIFICANT CHANGE UP (ref 4.2–5.8)
RBC # FLD: 11.8 % — SIGNIFICANT CHANGE UP (ref 10.3–14.5)
SODIUM SERPL-SCNC: 143 MMOL/L — SIGNIFICANT CHANGE UP (ref 135–145)
TROPONIN T, HIGH SENSITIVITY RESULT: 9 NG/L — SIGNIFICANT CHANGE UP (ref 0–51)
WBC # BLD: 10.21 K/UL — SIGNIFICANT CHANGE UP (ref 3.8–10.5)
WBC # FLD AUTO: 10.21 K/UL — SIGNIFICANT CHANGE UP (ref 3.8–10.5)

## 2023-02-20 PROCEDURE — 99285 EMERGENCY DEPT VISIT HI MDM: CPT

## 2023-02-20 PROCEDURE — 71046 X-RAY EXAM CHEST 2 VIEWS: CPT | Mod: 26

## 2023-02-20 NOTE — ED PROVIDER NOTE - RAPID ASSESSMENT
42M no pertinent history presents to ED c/o waxing and waning chest pain a/w tightness onset half an hour ago today (9:15pm). Pt has nausea. and thinks he is experiencing an anxiety attack after verbal argument. Pt denies smoking history and does recreational drinking.    note: The scribe (Laxmi Ojeda)'s documentation has been prepared under my direction and personally reviewed by me.  Patient was seen as a QPA patient, a thorough physical exam was not performed. The patient will be seen and further worked up in the main emergency department and their care will be completed by the main emergency department team. Receiving team will follow up on labs, analgesia, any clinical imaging, reassess and disposition as clinically indicated, all decisions regarding the progression of care will be made at their discretion. Seen by Diana Moreno (PA) and Laxmi Ojeda (Scribe). 42M hx gastric sleeve presents to ED c/o waxing and waning chest pain a/w tightness onset half an hour ago today (9:15pm) that began following verbal argument with his girlfriend. discomfort is described as a cramping sensation that is nonradiating and associated with sob. Pt has nausea. patient reports increased anxiety recently, denies si/hi. denies elicit substance abuse, social etoh use. no FH early cardiac death.    note: The scribe (Laxmi Ojeda)'s documentation has been prepared under my direction and personally reviewed by me.  Patient was seen as a QPA patient, a thorough physical exam was not performed. The patient will be seen and further worked up in the main emergency department and their care will be completed by the main emergency department team. Receiving team will follow up on labs, analgesia, any clinical imaging, reassess and disposition as clinically indicated, all decisions regarding the progression of care will be made at their discretion. Seen by Diana Moreno (PA) and Laxmi Ojeda (Scribe).    Diana Moreno PA-C: The scribe's documentation has been prepared under my direction and personally reviewed by me in its entirety. I confirm that the note above accurately reflects history obtained.   Patient was rapidly assessed via telemedicine encounter; a limited history was obtained. The patient will be seen and further examined and worked up in the main ED and their care will be completed by the main ED team. Receiving team will follow up on labs, analgesia, any clinical imaging, and perform reassessment and disposition of the patient as clinically indicated. All decisions regarding the progression of care will be made at their discretion. 42M hx gastric sleeve presents to ED c/o waxing and waning chest pain a/w tightness onset half an hour ago today (9:15pm) that began following verbal argument with his girlfriend. discomfort is described as a cramping sensation that is nonradiating and associated with sob. Pt has nausea. patient reports increased anxiety recently, denies si/hi. denies elicit substance abuse, social etoh use. no FH early cardiac death.    note: The scribe (Laxmi Ojeda)'s documentation has been prepared under my direction and personally reviewed by me.  Patient was seen as a QPA patient, a thorough physical exam was not performed. The patient will be seen and further worked up in the main emergency department and their care will be completed by the main emergency department team. Receiving team will follow up on labs, analgesia, any clinical imaging, reassess and disposition as clinically indicated, all decisions regarding the progression of care will be made at their discretion. Seen by Diana Moreno (PA) and Laxmi Ojeda (Scribe).    Diana Moreno PA-C: The scribe's documentation has been prepared under my direction and personally reviewed by me in its entirety. I confirm that the note above accurately reflects history obtained.   Patient was rapidly assessed via telemedicine encounter; a limited history was obtained. The patient will be seen and further examined and worked up in the main ED and their care will be completed by the main ED team. Receiving team will follow up on labs, analgesia, any clinical imaging, and perform reassessment and disposition of the patient as clinically indicated. All decisions regarding the progression of care will be made at their discretion.  Patient seen by hospitals- Dr. Sterling available for questions regarding this patient and no questions were asked.

## 2023-02-20 NOTE — ED PROVIDER NOTE - OBJECTIVE STATEMENT
latisha attending- 42M hx gastric sleeve presents to ED c/o waxing and waning chest pain a/w tightness onset half an hour ago today (9:15pm) that began following verbal argument with his girlfriend. discomfort is described as a cramping sensation that is nonradiating and associated with sob. Pt has nausea. patient reports increased anxiety recently, denies si/hi. denies elicit substance abuse, social etoh use. no FH early cardiac death.

## 2023-02-20 NOTE — ED PROVIDER NOTE - PHYSICAL EXAMINATION
Physical Exam:  Gen: NAD, AOx3, non-toxic appearing  Head: normal appearing  HEENT: normal conjunctiva, oral mucosa moist  Lung:  no respiratory distress, speaking in full sentences, clear to ascultation bilaterally     CV: regular rate and rhythm   Abd: soft, ND, NT  MSK: no visible deformities  Neuro: No focal deficits  Skin: Warm  Psych: normal affect  ~Ricco Encinas D.O. -ED Attending

## 2023-02-20 NOTE — ED PROVIDER NOTE - NSFOLLOWUPINSTRUCTIONS_ED_ALL_ED_FT
1. TAKE ALL MEDICATIONS AS DIRECTED.    2. FOR PAIN OR FEVER YOU CAN TAKE IBUPROFEN (MOTRIN, ADVIL) OR ACETAMINOPHEN (TYLENOL) AS NEEDED, AS DIRECTED ON PACKAGING.  3. FOLLOW UP WITH YOUR PRIMARY DOCTOR WITHIN 5 DAYS AS DIRECTED.  4. IF YOU HAD LABS OR IMAGING DONE, YOU WERE GIVEN COPIES OF ALL LABS AND/OR IMAGING RESULTS FROM YOUR ER VISIT--PLEASE TAKE THEM WITH YOU TO YOUR FOLLOW UP APPOINTMENTS.  5. RETURN TO THE ER FOR ANY WORSENING SYMPTOMS OR CONCERNS.    Chest Pain    Chest pain can be caused by many different conditions which may or may not be dangerous. Causes include heartburn, lung infections, heart attack, blood clot in lungs, skin infections, strain or damage to muscle, cartilage, or bones, etc. In addition to a history and physical examination, an electrocardiogram (ECG) or other lab tests may have been performed to determine the cause of your chest pain. Follow up with your primary care provider or with a cardiologist as instructed.     SEEK IMMEDIATE MEDICAL CARE IF YOU HAVE ANY OF THE FOLLOWING SYMPTOMS: worsening chest pain, coughing up blood, unexplained back/neck/jaw pain, severe abdominal pain, dizziness or lightheadedness, fainting, shortness of breath, sweaty or clammy skin, vomiting, or racing heart beat. These symptoms may represent a serious problem that is an emergency. Do not wait to see if the symptoms will go away. Get medical help right away. Call 911 and do not drive yourself to the hospital.

## 2023-02-20 NOTE — ED PROVIDER NOTE - CLINICAL SUMMARY MEDICAL DECISION MAKING FREE TEXT BOX
Home
pw chest pain, ekg pt on monitor, enzymes, cxr for pneumothorax or infiltrates, less likely PE with low risk on wells and PERC neg, unlikely dissection will disposition based on heart score unlikely psych needs no sihiahvh

## 2023-02-20 NOTE — ED PROVIDER NOTE - PROGRESS NOTE DETAILS
[facility] ED Attending- Patient feels well, tolerating PO. Discussed lab and radiology findings with patient. Patient feels comfortable going home. Gave home care and follow up instructions. Discussed which symptoms to look out for and when to return to the ED for further evaluation. Patient given opportunity to ask questions about their medical condition and had all questions answered.

## 2023-02-20 NOTE — ED PROVIDER NOTE - ATTENDING CONTRIBUTION TO CARE
I, Ricco Encinas, performed a history and physical exam of the patient and discussed their management with the resident and/or advanced care provider. I reviewed the resident and/or advanced care provider's note and agree with the documented findings and plan of care. I was present and available for all procedures.    See my full note for details

## 2023-02-20 NOTE — ED PROVIDER NOTE - PATIENT PORTAL LINK FT
You can access the FollowMyHealth Patient Portal offered by [facility] by registering at the following website: [email or web address]. By joining [employer]’s FollowMyHealth portal, you will also be able to view your health information using other applications (apps) compatible with our system.

## 2023-02-21 ENCOUNTER — APPOINTMENT (OUTPATIENT)
Dept: ORTHOPEDIC SURGERY | Facility: CLINIC | Age: 43
End: 2023-02-21
Payer: MEDICAID

## 2023-02-21 VITALS
DIASTOLIC BLOOD PRESSURE: 72 MMHG | RESPIRATION RATE: 18 BRPM | HEART RATE: 72 BPM | TEMPERATURE: 98 F | OXYGEN SATURATION: 98 % | SYSTOLIC BLOOD PRESSURE: 118 MMHG

## 2023-02-21 DIAGNOSIS — Z01.818 ENCOUNTER FOR OTHER PREPROCEDURAL EXAMINATION: ICD-10-CM

## 2023-02-21 DIAGNOSIS — M16.12 UNILATERAL PRIMARY OSTEOARTHRITIS, LEFT HIP: ICD-10-CM

## 2023-02-21 DIAGNOSIS — Z96.642 PRESENCE OF LEFT ARTIFICIAL HIP JOINT: ICD-10-CM

## 2023-02-21 LAB — TROPONIN T, HIGH SENSITIVITY RESULT: 7 NG/L — SIGNIFICANT CHANGE UP (ref 0–51)

## 2023-02-21 PROCEDURE — 85025 COMPLETE CBC W/AUTO DIFF WBC: CPT

## 2023-02-21 PROCEDURE — 84484 ASSAY OF TROPONIN QUANT: CPT

## 2023-02-21 PROCEDURE — 96374 THER/PROPH/DIAG INJ IV PUSH: CPT

## 2023-02-21 PROCEDURE — 99024 POSTOP FOLLOW-UP VISIT: CPT

## 2023-02-21 PROCEDURE — 71046 X-RAY EXAM CHEST 2 VIEWS: CPT

## 2023-02-21 PROCEDURE — 99285 EMERGENCY DEPT VISIT HI MDM: CPT | Mod: 25

## 2023-02-21 PROCEDURE — 80053 COMPREHEN METABOLIC PANEL: CPT

## 2023-02-21 PROCEDURE — 36415 COLL VENOUS BLD VENIPUNCTURE: CPT

## 2023-02-21 RX ORDER — ONDANSETRON 8 MG/1
4 TABLET, FILM COATED ORAL ONCE
Refills: 0 | Status: COMPLETED | OUTPATIENT
Start: 2023-02-21 | End: 2023-02-21

## 2023-02-21 RX ORDER — POTASSIUM CHLORIDE 20 MEQ
40 PACKET (EA) ORAL ONCE
Refills: 0 | Status: COMPLETED | OUTPATIENT
Start: 2023-02-21 | End: 2023-02-21

## 2023-02-21 RX ORDER — ALPRAZOLAM 0.25 MG
0.5 TABLET ORAL ONCE
Refills: 0 | Status: DISCONTINUED | OUTPATIENT
Start: 2023-02-21 | End: 2023-02-21

## 2023-02-21 RX ORDER — SODIUM CHLORIDE 9 MG/ML
1000 INJECTION INTRAMUSCULAR; INTRAVENOUS; SUBCUTANEOUS ONCE
Refills: 0 | Status: COMPLETED | OUTPATIENT
Start: 2023-02-21 | End: 2023-02-21

## 2023-02-21 RX ADMIN — Medication 40 MILLIEQUIVALENT(S): at 01:52

## 2023-02-21 RX ADMIN — Medication 0.5 MILLIGRAM(S): at 02:31

## 2023-02-21 RX ADMIN — ONDANSETRON 4 MILLIGRAM(S): 8 TABLET, FILM COATED ORAL at 02:18

## 2023-02-21 RX ADMIN — SODIUM CHLORIDE 1000 MILLILITER(S): 9 INJECTION INTRAMUSCULAR; INTRAVENOUS; SUBCUTANEOUS at 02:18

## 2023-02-21 NOTE — PHYSICAL EXAM
[de-identified] : Well developed, well nourished in no apparent distress, awake, alert and orientated to person, place and time with appropriate mood and affect\par Respirations are even and unlabored. Gait evaluation does not reveal a limp. There is no inguinal adenopathy. The affected limb is well-perfused with palpable pedal pulse, without skin lesions, shows a grossly normal motor and sensory examination. Incision is healed Hip motion is full and painless throughout ROM. Leg lengths are approximately equal

## 2023-02-21 NOTE — ED ADULT NURSE NOTE - OBJECTIVE STATEMENT
Pt is a 42y M PMH anxiety p/w chest discomfort. Pt reports being stressed while arguing with his girlfriend earlier. During argument pt started to feel chest pain, SOB, nausea, and weakness causing him to collapse to the ground. Denies LOC, head strike. Of note pt was febrile yesterday but afebrile on presentation. Denies current chest pain, abdominal pain. A&Ox4, VEL[place]QUE[place], lungs clear, distal pulses intact, abdomen soft, skin intact. Side rails up for safety, call bell and personal items within reach, instructed to call for assistance, verbalizes understanding. Will continue to monitor.

## 2023-02-21 NOTE — HISTORY OF PRESENT ILLNESS
[de-identified] : Status-post left total hip  arthroplasty here for routine postoperative evaluation. Excellent progress is noted in terms of pain and restoration of function. Pain is well controlled with oral medications. There has been no change in medical health since discharge. The patient does not require assistive devices.

## 2023-03-27 ENCOUNTER — EMERGENCY (EMERGENCY)
Facility: HOSPITAL | Age: 43
LOS: 1 days | Discharge: HOME | End: 2023-03-27
Attending: EMERGENCY MEDICINE
Payer: MEDICAID

## 2023-03-27 VITALS
RESPIRATION RATE: 18 BRPM | TEMPERATURE: 99 F | DIASTOLIC BLOOD PRESSURE: 76 MMHG | HEIGHT: 68 IN | SYSTOLIC BLOOD PRESSURE: 117 MMHG | HEART RATE: 95 BPM

## 2023-03-27 DIAGNOSIS — Z98.89 OTHER SPECIFIED POSTPROCEDURAL STATES: Chronic | ICD-10-CM

## 2023-03-27 DIAGNOSIS — Z90.3 ACQUIRED ABSENCE OF STOMACH [PART OF]: Chronic | ICD-10-CM

## 2023-03-27 PROCEDURE — 99284 EMERGENCY DEPT VISIT MOD MDM: CPT

## 2023-03-28 PROCEDURE — 83690 ASSAY OF LIPASE: CPT

## 2023-03-28 PROCEDURE — 80053 COMPREHEN METABOLIC PANEL: CPT

## 2023-03-28 PROCEDURE — 74177 CT ABD & PELVIS W/CONTRAST: CPT | Mod: 26

## 2023-03-28 PROCEDURE — 81001 URINALYSIS AUTO W/SCOPE: CPT

## 2023-03-28 PROCEDURE — 96375 TX/PRO/DX INJ NEW DRUG ADDON: CPT

## 2023-03-28 PROCEDURE — 99284 EMERGENCY DEPT VISIT MOD MDM: CPT | Mod: 25

## 2023-03-28 PROCEDURE — 36415 COLL VENOUS BLD VENIPUNCTURE: CPT

## 2023-03-28 PROCEDURE — 85025 COMPLETE CBC W/AUTO DIFF WBC: CPT

## 2023-03-28 PROCEDURE — 74177 CT ABD & PELVIS W/CONTRAST: CPT

## 2023-03-28 PROCEDURE — 96374 THER/PROPH/DIAG INJ IV PUSH: CPT | Mod: XU

## 2023-03-28 NOTE — ED PROVIDER NOTE - PROGRESS NOTE DETAILS
Dr. Wiggins and Dr. Lamar: I was not involved in the care of this patient and am only entering information to back log for downtime. Please see paper chart for isolation and medical management details, as the mandatory fields in the disposition section may not be accurate.

## 2023-03-28 NOTE — ED PROVIDER NOTE - CLINICAL SUMMARY MEDICAL DECISION MAKING FREE TEXT BOX
Attending Yina:  major systems error d/t down time, retro charting, pt w/ hx of g stones and bariatric sleeve surg persenting w/ l flank pain felt like prior stone, no urinary symptoms, no sig ttp, plan for ct scan bariatrics consult, ua, cbc, cmp,

## 2023-03-28 NOTE — CONSULT NOTE ADULT - SUBJECTIVE AND OBJECTIVE BOX
42M hx gastric sleeve 1 y ago at [facility], hx of nephrolithiasis, p/w abdominal pain and nausea since yesterday.      PAST MEDICAL & SURGICAL HISTORY:  Asthma      OA (osteoarthritis)      Anxiety, mild      S/P tonsillectomy      S/P appendectomy      H/O gastric sleeve                 42M hx gastric sleeve 3 y ago at [facility], hx of nephrolithiasis (prior episode of R ureteral stone w mild hydroureteronephrosis in  07/2020), p/w L flank pain and nausea since yesterday. Patient states symptoms are similar with the episode of renal colic he had 3y ago. Patient denies vomiting, and is passing flatus and had a soft bowel movement yesterday night. Pt denies any chest pain or shortness of breath. WBC wnl, lipase 121.    Surgery consulted for evaluation in the setting of prior bariatric surgery.    PAST MEDICAL & SURGICAL HISTORY:  Asthma      OA (osteoarthritis)      Anxiety, mild      S/P tonsillectomy      S/P appendectomy      H/O gastric sleeve      Physical exam  General: NAD  Cardiac: Regular rate  Respiratory: Nonlabored breathing  Abdominal Exam: soft, nondistended, L flank/L abdomen tenderness, no rebound, no guarding  Muskuloskeletal: Moves all 4 extremities spontaneously  Neurological: Alert and oriented    LABS:                           13.6   7.20  )-----------( 236      ( 28 Mar 2023 03:07 )             40.9     03-28    139  |  101  |  19  ----------------------------<  72  3.9   |  28  |  0.73    Ca    9.1      28 Mar 2023 03:07    TPro  7.2  /  Alb  4.4  /  TBili  0.3  /  DBili  x   /  AST  22  /  ALT  18  /  AlkPhos  78  03-28                         42M hx gastric sleeve 3 y ago at [facility], hx of nephrolithiasis (prior episode of R ureteral stone w mild hydroureteronephrosis in  07/2020), p/w L flank pain and nausea since yesterday. Patient states symptoms are similar with the episode of renal colic he had 3y ago. Patient denies vomiting, and is passing flatus and had a soft bowel movement yesterday night. Pt denies any chest pain or shortness of breath. WBC wnl, lipase 121.    Surgery consulted for evaluation in the setting of prior bariatric surgery.    PAST MEDICAL & SURGICAL HISTORY:  Asthma      OA (osteoarthritis)      Anxiety, mild      S/P tonsillectomy      S/P appendectomy      H/O gastric sleeve      Physical exam  General: NAD  Cardiac: Regular rate  Respiratory: Nonlabored breathing  Abdominal Exam: soft, nondistended, L flank/L abdomen tenderness, no rebound, no guarding  Muskuloskeletal: Moves all 4 extremities spontaneously  Neurological: Alert and oriented    LABS:                           13.6   7.20  )-----------( 236      ( 28 Mar 2023 03:07 )             40.9     03-28    139  |  101  |  19  ----------------------------<  72  3.9   |  28  |  0.73    Ca    9.1      28 Mar 2023 03:07    TPro  7.2  /  Alb  4.4  /  TBili  0.3  /  DBili  x   /  AST  22  /  ALT  18  /  AlkPhos  78  03-28              ACC: [id number] EXAM:  CT ABDOMEN AND PELVIS OC IC   ORDERED BY: ASMITA BOONE     PROCEDURE DATE:  03/28/2023          INTERPRETATION:  CLINICAL INFORMATION: History of gastric sleeve and   kidney stones. Left lower quadrant and left flank pain.    COMPARISON: 7/30/2020.    CONTRAST/COMPLICATIONS:  IV Contrast: Omnipaque 350  90 cc administered   10 cc discarded  Oral Contrast: Omnipaque 300  Complications: None reported at time of study completion    PROCEDURE:  CT of the Abdomen and Pelvis was performed.  Sagittal and coronal reformats were performed.    FINDINGS:  LOWER CHEST: Within normal limits.    LIVER: Within normal limits.  BILE DUCTS: Normal caliber.  GALLBLADDER: Within normal limits.  SPLEEN: Within normal limits.  PANCREAS: Within normal limits.  ADRENALS: Within normal limits.  KIDNEYS/URETERS: There is a 1 cm left renal cyst. The kidneys otherwise   enhance symmetrically. There is no urinary tract obstruction..    BLADDER: Minimally distended.  REPRODUCTIVE ORGANS: Prostate within normal limits.    BOWEL: No bowel obstruction. Appendix is surgically absent. Large stool   burden is seen throughout the colon. This may indicate constipation.   There is a small hiatal hernia. Gastric sleeve changes are noted. Enteric   contrast reaches the cecum at the time scanning. There is colonic   diverticulosis. There is no focal diverticulitis.  PERITONEUM: No ascites.  VESSELS: Within normal limits.  RETROPERITONEUM/LYMPH NODES: No lymphadenopathy.  ABDOMINAL WALL: Within normal limits.  BONES: Left hip replacement. Degenerative changes. Anterior wedging of   lower thoracic and upper lumbar vertebral bodies appears chronic..    IMPRESSION:  No urinary tract obstruction identified.    Probable constipation.    Post gastric sleeve changes and small hiatal hernia present.        --- End of Report ---             VENESSA COOMBS M.D., Attending Radiologist  This document has been electronically signed. Mar 28 2023  5:13AM

## 2023-03-28 NOTE — ED PROVIDER NOTE - PATIENT PORTAL LINK FT
You can access the FollowMyHealth Patient Portal offered by [facility] by registering at the following website: [email or web address]. By joining [employer]’s FollowMyHealth portal, you will also be able to view your health information using other applications (apps) compatible with our system.

## 2023-03-28 NOTE — CONSULT NOTE ADULT - ASSESSMENT
42M hx gastric sleeve 1 y ago at [facility] (2020), hx of nephrolithiasis, p/w abdominal pain and nausea since yesterday.      Recommendations  - CT scan of the abdomen and pelvis w/wo IV contrast  - NPO  - NS @ 125 with multivitamin  - Thiamine, folate   - Pantoprazole 40mg IV   - Labs including CBC/CMP/PT,INR,PTT/T&S and COVID STAT   - Will follow CT scan    [facility] surgery  [id number]  42M hx gastric sleeve 3y ago, hx of nephrolithiasis, p/w L flank pain and nausea since yesterday. Surgery consulted for evaluation in the setting of prior bariatric surgery.      Recommendations  - CT scan of the abdomen and pelvis w PO contrast and w/wo IV contrast  - NPO  - NS @ 125 with multivitamin  - Thiamine, folate   - Pantoprazole 40mg IV   - Labs including CBC/CMP/PT,INR,PTT/T&S and COVID STAT   - Will follow CT scan    Green surgery  [id number]  42M hx gastric sleeve 3y ago, hx of nephrolithiasis, p/w L flank pain and nausea since yesterday. Surgery consulted for evaluation in the setting of prior bariatric surgery.      Recommendations  - No acute surgical intervention  - CT scan of the abdomen and pelvis reviewed  - PO challenge  - Patient may follow up with Bariatric Surgeon at [facility] for management of small hiatal hernia  - Plan discussed with Dr. Toni Devi surgery  [id number]

## 2023-03-28 NOTE — ED PROVIDER NOTE - HIV OFFER
Date of service: 11/8/2017     Chief complaint: Shortness of breath     Subjective: Patient feels much less short of breath since her dialysis treatment. Appreciate ID, cardiology, and nephrology notes. Supple last night.       Objective:   Vital Signs: Blood pressure 132/60, pulse 80, temperature 97.2 °F (36.2 °C), temperature source Oral, resp. rate 18, height 5' 3\" (1.6 m), weight 88.5 kg, SpO2 90 %.     General: Alert, cooperative, good spirits  Anteriorly and posteriorly CTA:    Heart:  Regular rate and rhythm with 2/6 JULIANA.  She is not currently in atrial fibrillation  Abdomen: Not examined  Extremities: 2+ bilateral leg edema     Studies:  None    Labs:   Recent Results (from the past 12 hour(s))   Prothrombin Time    Collection Time: 11/08/17 12:25 AM   Result Value Ref Range    PROTIME 13.4 (H) 9.7 - 11.8 sec    INR 1.2    Iron and TIBC    Collection Time: 11/08/17  5:13 AM   Result Value Ref Range    IRON 27 (L) 50 - 170 mcg/dL    Total Iron Binding Capacity 139 (L) 250 - 450 mcg/dL    PERCENT IRON SATURATION 19 15 - 45 %   CBC No Differential    Collection Time: 11/08/17  5:13 AM   Result Value Ref Range    WBC 13.3 (H) 4.2 - 11.0 K/mcL    RBC 2.76 (L) 4.00 - 5.20 mil/mcL    HGB 8.2 (L) 12.0 - 15.5 g/dL    HCT 27.3 (L) 36.0 - 46.5 %    MCV 98.9 78.0 - 100.0 fl    MCH 29.7 26.0 - 34.0 pg    MCHC 30.0 (L) 32.0 - 36.5 g/dL    RDW-CV 13.9 11.0 - 15.0 %     140 - 450 K/mcL   Basic Metabolic Panel    Collection Time: 11/08/17  5:13 AM   Result Value Ref Range    Sodium 138 135 - 145 mmol/L    Potassium 3.9 3.4 - 5.1 mmol/L    Chloride 100 98 - 107 mmol/L    Carbon Dioxide 26 21 - 32 mmol/L    Anion Gap 16 10 - 20 mmol/L    Glucose 99 65 - 99 mg/dL    BUN 53 (H) 6 - 20 mg/dL    Creatinine 4.54 (H) 0.51 - 0.95 mg/dL    GFR Estimate,  10     GFR Estimate, Non African American 9     BUN/Creatinine Ratio 12 7 - 25    CALCIUM 8.5 8.4 - 10.2 mg/dL   Phosphorus Level    Collection Time: 11/08/17   5:13 AM   Result Value Ref Range    PHOSPHORUS 5.1 (H) 2.4 - 4.7 mg/dL         Assessment:   1. Hospital acquired pneumonia  2. Acute UTI  3. End-stage renal disease status post dialysis  4. Anemia of chronic kidney disease  5. Leukocytosis, likely secondary to infections  6. Atrial fibrillation, currently normal sinus rhythm. Coumadin has been initiated.  7. Hypothyroidism  8. Interstitial cystitis  9. Generalized weakness, much improved       Plan: Continue dialysis. PT and OT to see the patient. We'll discontinue telemetry. Increase activity.     Date of service: 11/8/2017     Chief complaint: Duplicate, as above     Subjective:  Duplicate, as above     Objective:   Vital Signs: Blood pressure 132/60, pulse 80, temperature 97.2 °F (36.2 °C), temperature source Oral, resp. rate 18, height 5' 3\" (1.6 m), weight 88.5 kg, SpO2 90 %.     General: Duplicate, as above  Lungs:    Heart:    Abdomen:  Extremities:      Studies:  None    Labs:   Recent Results (from the past 12 hour(s))   Prothrombin Time    Collection Time: 11/08/17 12:25 AM   Result Value Ref Range    PROTIME 13.4 (H) 9.7 - 11.8 sec    INR 1.2    Iron and TIBC    Collection Time: 11/08/17  5:13 AM   Result Value Ref Range    IRON 27 (L) 50 - 170 mcg/dL    Total Iron Binding Capacity 139 (L) 250 - 450 mcg/dL    PERCENT IRON SATURATION 19 15 - 45 %   CBC No Differential    Collection Time: 11/08/17  5:13 AM   Result Value Ref Range    WBC 13.3 (H) 4.2 - 11.0 K/mcL    RBC 2.76 (L) 4.00 - 5.20 mil/mcL    HGB 8.2 (L) 12.0 - 15.5 g/dL    HCT 27.3 (L) 36.0 - 46.5 %    MCV 98.9 78.0 - 100.0 fl    MCH 29.7 26.0 - 34.0 pg    MCHC 30.0 (L) 32.0 - 36.5 g/dL    RDW-CV 13.9 11.0 - 15.0 %     140 - 450 K/mcL   Basic Metabolic Panel    Collection Time: 11/08/17  5:13 AM   Result Value Ref Range    Sodium 138 135 - 145 mmol/L    Potassium 3.9 3.4 - 5.1 mmol/L    Chloride 100 98 - 107 mmol/L    Carbon Dioxide 26 21 - 32 mmol/L    Anion Gap 16 10 - 20 mmol/L     Glucose 99 65 - 99 mg/dL    BUN 53 (H) 6 - 20 mg/dL    Creatinine 4.54 (H) 0.51 - 0.95 mg/dL    GFR Estimate,  10     GFR Estimate, Non African American 9     BUN/Creatinine Ratio 12 7 - 25    CALCIUM 8.5 8.4 - 10.2 mg/dL   Phosphorus Level    Collection Time: 11/08/17  5:13 AM   Result Value Ref Range    PHOSPHORUS 5.1 (H) 2.4 - 4.7 mg/dL         Assessment:   10. Duplicate, as above       Plan:       Previously Declined (within the last year)

## 2023-05-08 ENCOUNTER — NON-APPOINTMENT (OUTPATIENT)
Age: 43
End: 2023-05-08

## 2023-06-09 ENCOUNTER — APPOINTMENT (OUTPATIENT)
Dept: ORTHOPEDIC SURGERY | Facility: CLINIC | Age: 43
End: 2023-06-09

## 2023-06-22 ENCOUNTER — EMERGENCY (EMERGENCY)
Facility: HOSPITAL | Age: 43
LOS: 1 days | Discharge: HOME | End: 2023-06-22
Attending: EMERGENCY MEDICINE
Payer: MEDICAID

## 2023-06-22 VITALS
OXYGEN SATURATION: 98 % | TEMPERATURE: 98 F | HEART RATE: 61 BPM | DIASTOLIC BLOOD PRESSURE: 78 MMHG | RESPIRATION RATE: 17 BRPM | SYSTOLIC BLOOD PRESSURE: 122 MMHG

## 2023-06-22 VITALS
HEIGHT: 68 IN | DIASTOLIC BLOOD PRESSURE: 89 MMHG | RESPIRATION RATE: 18 BRPM | OXYGEN SATURATION: 96 % | WEIGHT: 184.97 LBS | HEART RATE: 81 BPM | SYSTOLIC BLOOD PRESSURE: 141 MMHG | TEMPERATURE: 99 F

## 2023-06-22 DIAGNOSIS — Z98.89 OTHER SPECIFIED POSTPROCEDURAL STATES: Chronic | ICD-10-CM

## 2023-06-22 DIAGNOSIS — Z90.3 ACQUIRED ABSENCE OF STOMACH [PART OF]: Chronic | ICD-10-CM

## 2023-06-22 LAB
ALBUMIN SERPL ELPH-MCNC: 4.3 G/DL — SIGNIFICANT CHANGE UP (ref 3.3–5)
ALP SERPL-CCNC: 81 U/L — SIGNIFICANT CHANGE UP (ref 40–120)
ALT FLD-CCNC: 16 U/L — SIGNIFICANT CHANGE UP (ref 10–45)
ANION GAP SERPL CALC-SCNC: 10 MMOL/L — SIGNIFICANT CHANGE UP (ref 5–17)
AST SERPL-CCNC: 20 U/L — SIGNIFICANT CHANGE UP (ref 10–40)
BASOPHILS # BLD AUTO: 0 K/UL — SIGNIFICANT CHANGE UP (ref 0–0.2)
BASOPHILS NFR BLD AUTO: 0 % — SIGNIFICANT CHANGE UP (ref 0–2)
BILIRUB SERPL-MCNC: 0.8 MG/DL — SIGNIFICANT CHANGE UP (ref 0.2–1.2)
BUN SERPL-MCNC: 13 MG/DL — SIGNIFICANT CHANGE UP (ref 7–23)
CALCIUM SERPL-MCNC: 9.5 MG/DL — SIGNIFICANT CHANGE UP (ref 8.4–10.5)
CHLORIDE SERPL-SCNC: 101 MMOL/L — SIGNIFICANT CHANGE UP (ref 96–108)
CO2 SERPL-SCNC: 28 MMOL/L — SIGNIFICANT CHANGE UP (ref 22–31)
CREAT SERPL-MCNC: 0.82 MG/DL — SIGNIFICANT CHANGE UP (ref 0.5–1.3)
EGFR: 112 ML/MIN/1.73M2 — SIGNIFICANT CHANGE UP
EOSINOPHIL # BLD AUTO: 0.31 K/UL — SIGNIFICANT CHANGE UP (ref 0–0.5)
EOSINOPHIL NFR BLD AUTO: 4.2 % — SIGNIFICANT CHANGE UP (ref 0–6)
GLUCOSE SERPL-MCNC: 105 MG/DL — HIGH (ref 70–99)
HCT VFR BLD CALC: 40.5 % — SIGNIFICANT CHANGE UP (ref 39–50)
HGB BLD-MCNC: 13.5 G/DL — SIGNIFICANT CHANGE UP (ref 13–17)
LYMPHOCYTES # BLD AUTO: 1.32 K/UL — SIGNIFICANT CHANGE UP (ref 1–3.3)
LYMPHOCYTES # BLD AUTO: 17.8 % — SIGNIFICANT CHANGE UP (ref 13–44)
MAGNESIUM SERPL-MCNC: 2.2 MG/DL — SIGNIFICANT CHANGE UP (ref 1.6–2.6)
MANUAL SMEAR VERIFICATION: SIGNIFICANT CHANGE UP
MCHC RBC-ENTMCNC: 30.8 PG — SIGNIFICANT CHANGE UP (ref 27–34)
MCHC RBC-ENTMCNC: 33.3 GM/DL — SIGNIFICANT CHANGE UP (ref 32–36)
MCV RBC AUTO: 92.3 FL — SIGNIFICANT CHANGE UP (ref 80–100)
MONOCYTES # BLD AUTO: 0.44 K/UL — SIGNIFICANT CHANGE UP (ref 0–0.9)
MONOCYTES NFR BLD AUTO: 5.9 % — SIGNIFICANT CHANGE UP (ref 2–14)
MYELOCYTES NFR BLD: 0.9 % — HIGH (ref 0–0)
NEUTROPHILS # BLD AUTO: 5.26 K/UL — SIGNIFICANT CHANGE UP (ref 1.8–7.4)
NEUTROPHILS NFR BLD AUTO: 71.2 % — SIGNIFICANT CHANGE UP (ref 43–77)
NT-PROBNP SERPL-SCNC: <36 PG/ML — SIGNIFICANT CHANGE UP (ref 0–300)
PLAT MORPH BLD: NORMAL — SIGNIFICANT CHANGE UP
PLATELET # BLD AUTO: 273 K/UL — SIGNIFICANT CHANGE UP (ref 150–400)
POTASSIUM SERPL-MCNC: 3.8 MMOL/L — SIGNIFICANT CHANGE UP (ref 3.5–5.3)
POTASSIUM SERPL-SCNC: 3.8 MMOL/L — SIGNIFICANT CHANGE UP (ref 3.5–5.3)
PROT SERPL-MCNC: 7.1 G/DL — SIGNIFICANT CHANGE UP (ref 6–8.3)
RBC # BLD: 4.39 M/UL — SIGNIFICANT CHANGE UP (ref 4.2–5.8)
RBC # FLD: 12.9 % — SIGNIFICANT CHANGE UP (ref 10.3–14.5)
RBC BLD AUTO: SIGNIFICANT CHANGE UP
SODIUM SERPL-SCNC: 139 MMOL/L — SIGNIFICANT CHANGE UP (ref 135–145)
TROPONIN T, HIGH SENSITIVITY RESULT: <6 NG/L — SIGNIFICANT CHANGE UP (ref 0–51)
WBC # BLD: 7.39 K/UL — SIGNIFICANT CHANGE UP (ref 3.8–10.5)
WBC # FLD AUTO: 7.39 K/UL — SIGNIFICANT CHANGE UP (ref 3.8–10.5)

## 2023-06-22 PROCEDURE — 85025 COMPLETE CBC W/AUTO DIFF WBC: CPT

## 2023-06-22 PROCEDURE — 83735 ASSAY OF MAGNESIUM: CPT

## 2023-06-22 PROCEDURE — 71045 X-RAY EXAM CHEST 1 VIEW: CPT | Mod: 26

## 2023-06-22 PROCEDURE — 99285 EMERGENCY DEPT VISIT HI MDM: CPT | Mod: 25

## 2023-06-22 PROCEDURE — 99285 EMERGENCY DEPT VISIT HI MDM: CPT

## 2023-06-22 PROCEDURE — 84484 ASSAY OF TROPONIN QUANT: CPT

## 2023-06-22 PROCEDURE — 93005 ELECTROCARDIOGRAM TRACING: CPT

## 2023-06-22 PROCEDURE — 71045 X-RAY EXAM CHEST 1 VIEW: CPT

## 2023-06-22 PROCEDURE — 80053 COMPREHEN METABOLIC PANEL: CPT

## 2023-06-22 PROCEDURE — 83880 ASSAY OF NATRIURETIC PEPTIDE: CPT

## 2023-06-22 RX ORDER — IBUPROFEN 200 MG
600 TABLET ORAL ONCE
Refills: 0 | Status: COMPLETED | OUTPATIENT
Start: 2023-06-22 | End: 2023-06-22

## 2023-06-22 RX ADMIN — Medication 600 MILLIGRAM(S): at 08:27

## 2023-06-22 NOTE — ED PROVIDER NOTE - OBJECTIVE STATEMENT
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  Patient states that pain is better when leaning forward and has history of costochondritis.  Denies fever, nausea, vomiting, diaphoresis, lightheadedness, dizziness, abdominal pain, dysuria, diarrhea.

## 2023-06-22 NOTE — ED ADULT NURSE NOTE - OBJECTIVE STATEMENT
Patient is a 42 year old male complaining of chest discomfort. Patient reports chest tightness started yesterday during a fitness class then went away but came back this morning around 5am worse than yesterday. Patient reports constant chest tightness associated with shortness of breath and lightheadedness. On assessment A&Ox4, breathing comfortably on room air, no accessory muscle use, normal sinus rhythm on the cardiac monitor, no edema, no jvd, abdomen soft, ambulating independently. Patient denies palpitations, abdominal pain, n/v/d, urinary symptoms, fevers, chills, weakness, sick contacts, traveling at this time.

## 2023-06-22 NOTE — ED ADULT NURSE NOTE - NSFALLUNIVINTERV_ED_ALL_ED
Bed/Stretcher in lowest position, wheels locked, appropriate side rails in place/Call bell, personal items and telephone in reach/Instruct patient to call for assistance before getting out of bed/chair/stretcher/Non-slip footwear applied when patient is off stretcher/[place] to call system/Physically safe environment - no spills, clutter or unnecessary equipment/Purposeful proactive rounding/Room/bathroom lighting operational, light cord in reach

## 2023-06-22 NOTE — ED PROVIDER NOTE - NSFOLLOWUPINSTRUCTIONS_ED_ALL_ED_FT
1. Please follow up with your primary care doctor within the next week.    Chest Pain    Chest pain can be caused by many different conditions which may or may not be dangerous. Causes include heartburn, lung infections, heart attack, blood clot in lungs, skin infections, strain or damage to muscle, cartilage, or bones, etc. In addition to a history and physical examination, an electrocardiogram (ECG) or other lab tests may have been performed to determine the cause of your chest pain. Follow up with your primary care provider or with a cardiologist as instructed.       SEEK IMMEDIATE MEDICAL CARE IF YOU HAVE ANY OF THE FOLLOWING SYMPTOMS: worsening chest pain, coughing up blood, unexplained back/neck/jaw pain, severe abdominal pain, dizziness or lightheadedness, fainting, shortness of breath, sweaty or clammy skin, vomiting, or racing heart beat. These symptoms may represent a serious problem that is an emergency. Do not wait to see if the symptoms will go away. Get medical help right away. Call 911 and do not drive yourself to the hospital.

## 2023-06-22 NOTE — ED PROVIDER NOTE - PHYSICAL EXAMINATION
GEN: Patient awake alert NAD.   HEENT: normocephalic, atraumatic, moist MM  CARDIAC: RRR, S1, S2, no murmur.   PULM: CTA B/L no wheeze, rhonchi, rales.   ABD: soft NT, ND, no rebound no guarding, no CVA tenderness.   MSK: Moving all extremities, no edema, no calf edema or ttp  NEURO: A&Ox3, no focal neurological deficits  SKIN: warm, dry, no rash. 2+ pulses in all extremities.

## 2023-06-22 NOTE — ED PROVIDER NOTE - NS ED ROS FT
GENERAL: No fever, chills  EYES: no vision changes, no discharge.   ENT: no difficulty swallowing or speaking   CARDIAC: + chest pain/pressure, SOB, no lower extremity swelling  PULMONARY: no cough, +SOB  GI: no abdominal pain, n/v/d  : no dysuria, no hematuria  SKIN: no rashes, no ecchymosis  NEURO: no headache, lightheadedness  MSK: No joint pain, myalgia, weakness.

## 2023-06-22 NOTE — ED PROVIDER NOTE - CLINICAL SUMMARY MEDICAL DECISION MAKING FREE TEXT BOX
Mehrdad: Patient is 42-year-old gentleman presenting with atypical chest pain.  Patient with history of costochondritis.  Patient's EKG looks normal.  Patient's heart score would be 0-1 before getting a troponin.  Believe that risk is low but will send labs.  Will check chest x-ray because of shortness of breath.  If everything is normal we will discharge

## 2023-06-22 NOTE — ED PROVIDER NOTE - PROGRESS NOTE DETAILS
Cruz Urrutia, DO PGY-2: Labs nonactionable, troponin negative, chest x-ray negative, patient feels improved with Motrin.  Will discharge patient to follow-up with PMD and continue Motrin as needed every 6 hours for the next few days.

## 2023-06-22 NOTE — ED PROVIDER NOTE - PATIENT PORTAL LINK FT
You can access the FollowMyHealth Patient Portal offered by [facility] by registering at the following website: [email or web address]. By joining [email or web address]’s FollowMyHealth portal, you will also be able to view your health information using other applications (apps) compatible with our system.

## 2023-06-22 NOTE — ASU DISCHARGE PLAN (ADULT/PEDIATRIC) - PROVIDER TOKENS
06/22/23 1318   Right Leg Edema Point of Measurement   Leg circumference 38 cm   Ankle circumference 24 cm   Compression Therapy Other  (none present)   RLE Neurovascular Assessment   Capillary Refill Less than/Equal to 3 seconds   Color Red; Appropriate for Ethnicity   Temperature Warm   R Pedal Pulse +2   Wound 05/18/23 Leg Lower;Right   Date First Assessed: 05/18/23   Present on Hospital Admission: Yes  Wound Approximate Age at First Assessment (Weeks): 60 weeks  Location: Leg  Wound Location Orientation: Lower;Right   Wound Image    Dressing Status   (open to air)   Wound Cleansed Cleansed with saline   Wound Length (cm) 3 cm   Wound Width (cm) 0.7 cm   Wound Depth (cm) 0.1 cm   Wound Surface Area (cm^2) 2.1 cm^2   Change in Wound Size % (l*w) 96.18   Wound Volume (cm^3) 0.21 cm^3   Wound Healing % 96   Wound Assessment Slough   Drainage Amount Small   Drainage Description Serosanguinous   Odor None   Naye-wound Assessment Dry/flaky; Blanchable erythema   Margins Flat/open edges   Wound Thickness Description not for Pressure Injury Full thickness   Pain Assessment   Pain Assessment None - Denies Pain     /82   Pulse (!) 108   Temp 97.8 °F (36.6 °C) (Temporal)
06/22/23 1340   Wound 05/18/23 Leg Lower;Right   Date First Assessed: 05/18/23   Present on Hospital Admission: Yes  Wound Approximate Age at First Assessment (Weeks): 60 weeks  Location: Leg  Wound Location Orientation: Lower;Right   Dressing Status New dressing applied   Dressing/Treatment   (xeroform, gauze, roll gauze secured with tape.)
PROVIDER:[TOKEN:[[id number]]]

## 2023-06-30 NOTE — ED PROVIDER NOTE - INTERPRETATION
"ANTICOAGULATION MANAGEMENT     Nery Whitaker 56 year old female is on warfarin with therapeutic INR result. (Goal INR 2.0-3.0)    Recent labs: (last 7 days)     06/30/23  0741   INR 2.8*       ASSESSMENT     Warfarin Lab Questionnaire    Warfarin Doses Last 7 Days      6/30/2023     7:42 AM   Dose in Tablet or Mg   TAB or MG? milligram (mg)     Pt Rptd Dose SUNDAY MONDAY TUESDAY WED THURS FRIDAY SATURDAY 6/30/2023   7:42 AM 5 7.5 5 7.5 5 5 7.5         6/30/2023   Warfarin Lab Questionnaire   Missed doses within past 14 days? No   Changes in diet or alcohol within past 14 days? No   Medication changes since last result? No   Injuries or illness since last result? No   New shortness of breath, severe headaches or sudden changes in vision since last result? No   Abnormal bleeding since last result? No   Upcoming surgery, procedure? No   Best number to call with results? [phone]     Previous result: Subtherapeutic  Additional findings: reports losing her sheet with dosing and \"just winging it\" for the past few weeks       PLAN     Recommended plan for no diet, medication or health factor changes affecting INR     Dosing Instructions: Continue your current warfarin dose that you have been doing with next INR in 4 weeks       Summary  As of 6/30/2023    Full warfarin instructions:  7.5 mg every Mon, Wed, Sat; 5 mg all other days   Next INR check:  7/28/2023             Telephone call with Nery who verbalizes understanding and agrees to plan  Sent [employer] message with dosing and follow up instructions    Check at provider office visit    Education provided:     Contact [phone] with any changes, questions or concerns.     Plan made per [facility] anticoagulation protocol    Marjorie Ball, RN  Anticoagulation Clinic  6/30/2023    _______________________________________________________________________     Anticoagulation Episode Summary     Current INR goal:  2.0-3.0   TTR:  52.7 % (1 y)   Target end date:  " Indefinite   Send INR reminders to:  BERNICE MARTINI    Indications    Anticoagulation monitoring  INR range 2-3 [Z79.01]  PAD (peripheral artery disease) (H) [I73.9]  Long term (current) use of anticoagulants [Z79.01]  Hypercoagulable state (H) [D68.59]  Bilateral carotid artery disease  unspecified type (H) [I77.9]           Comments:           Anticoagulation Care Providers     Provider Role Specialty Phone number    Loraine Rees MD Referring Family Medicine [phone]    Vu Winters MD Referring Family Medicine [phone]           normal sinus rhythm

## 2023-07-07 NOTE — ED ADULT NURSE NOTE - NS ED NURSE LEVEL OF CONSCIOUSNESS MENTAL STATUS
ELECTROENCEPHALOGRAM (EEG) REPORT    Date of EE23  Patient name:Earl Robbins  : 1938   MRN: [id number]  Ordering Provider: Kashmir March MD    History  The patient is a 85 year old male with history of atrial fibrillation-on AC, htn, aortic valve disease s/p AVR, who presented after an episode of unresponsiveness that lasted 10-15 minutes and occurred earlier today.       CNS active medications include: None.    Technical Description  This is an Inpatient  study performed using a 16 or 18 channel digital electroencephalographic recording with EKG monitoring and video recording. 24 active electrodes were applied according to the International 10-20 electrode placement system.  Digital reformatting using multiple montages were used with adjustments to gain and filter settings as needed to aid in interpretation of the study . The record is of good technical quality for purposes of interpretation.   Activation procedures used during the EEG include: none. The record was obtained with the patient in the awake and drowsy states.      EEG total recording time: 23 minutes.  The recording began at 14:11 hours.    EEG Description and Findings  Awake: The waking background consists of well organized, reactive, 10Hz, 10-20 microvolt activity, symmetrically distributed over posterior head regions. Low amplitude fast activity is seen symmetrically over frontocentral regions.     Drowsiness/Sleep: Drowsiness is characterized by symmetric centrotemporal slowing.  Sleep architecture was not seen.     Activation: Hyperventilation and photic stimulation were not performed.      Focal slow wave abnormalities: None observed.    Epileptiform abnormalities: None observed.    Impression and Clinical Correlation:  This is a normal awake and drowsy EEG.    Seizures, epileptiform discharges and focal slowing were not seen.    Stage II sleep was not recorded.     Clinical Correlation: The absence of epilepiform  abnormalities does not preclude a diagnosis of seizure.     Stephani Felix, DO   Cooperative/Awake/Alert

## 2023-08-22 ENCOUNTER — EMERGENCY (EMERGENCY)
Facility: HOSPITAL | Age: 43
LOS: 1 days | Discharge: HOME | End: 2023-08-22
Attending: EMERGENCY MEDICINE
Payer: MEDICAID

## 2023-08-22 VITALS
DIASTOLIC BLOOD PRESSURE: 90 MMHG | WEIGHT: 199.08 LBS | HEIGHT: 68.5 IN | RESPIRATION RATE: 16 BRPM | OXYGEN SATURATION: 96 % | HEART RATE: 63 BPM | TEMPERATURE: 98 F | SYSTOLIC BLOOD PRESSURE: 131 MMHG

## 2023-08-22 DIAGNOSIS — Z98.89 OTHER SPECIFIED POSTPROCEDURAL STATES: Chronic | ICD-10-CM

## 2023-08-22 DIAGNOSIS — Z90.3 ACQUIRED ABSENCE OF STOMACH [PART OF]: Chronic | ICD-10-CM

## 2023-08-22 PROCEDURE — 99284 EMERGENCY DEPT VISIT MOD MDM: CPT

## 2023-08-22 NOTE — ED PROVIDER NOTE - NSFOLLOWUPINSTRUCTIONS_ED_ALL_ED_FT
Concussion, Adult  A series of images showing how the quick head movements of a concussion injure the brain.  A concussion is a brain injury from a hard, direct hit (trauma) to the head or body. This direct hit causes the brain to shake quickly back and forth inside the skull. This can damage brain cells and cause chemical changes in the brain. A concussion may also be known as a mild traumatic brain injury (TBI).    Concussions are usually not life-threatening, but the effects of a concussion can be serious. If you have a concussion, you should be very careful to avoid having a second concussion.    What are the causes?  This condition is caused by:  A direct hit to your head, such as:  Running into another player during a game.  Being hit in a fight.  Hitting your head on a hard surface.  Sudden movement of your body that causes your brain to move back and forth inside the skull, such as in a car crash.  What are the signs or symptoms?  The signs of a concussion can be hard to notice. Early on, they may be missed by you, family members, and health care providers. You may look fine on the outside but may act or feel differently.    Every head injury is different. Symptoms are usually temporary but may last for days, weeks, or even months. Some symptoms appear right away, but other symptoms may not show up for hours or days. If your symptoms last longer than normal, you may have post-concussion syndrome.    Physical symptoms    Headaches.  Dizziness and problems with coordination or balance.  Sensitivity to light or noise.  Nausea or vomiting.  Tiredness (fatigue).  Vision or hearing problems.  Changes in eating or sleeping patterns.  Seizure.  Mental and emotional symptoms    Irritability or mood changes.  Memory problems.  Trouble concentrating, organizing, or making decisions.  Slowness in thinking, acting or reacting, speaking, or reading.  Anxiety or depression.  How is this diagnosed?  This condition is diagnosed based on:  Your symptoms.  A description of your injury.  You may also have tests, including:  Imaging tests, such as a CT scan or an MRI.  Neuropsychological tests. These measure your thinking, understanding, learning, and remembering abilities.  How is this treated?  Treatment for this condition includes:  Stopping sports or activity if you are injured. If you hit your head or show signs of concussion:  Do not return to sports or activities the same day.  Get checked by a health care provider before you return to your activities.  Physical and mental rest and careful observation, usually at home. Gradually return to your normal activities.  Medicines to help with symptoms such as headaches, nausea, or difficulty sleeping.  Avoid taking opioid pain medicine while recovering from a concussion.  Avoiding alcohol and drugs. These may slow your recovery and can put you at risk of further injury.  Referral to a concussion clinic or rehabilitation center.  Recovery from a concussion can take time. How fast you recover depends on many factors. Return to activities only when:  Your symptoms are completely gone.  Your health care provider says that it is safe.  Follow these instructions at home:  Activity    Limit activities that require a lot of thought or concentration, such as:  Doing homework or job-related work.  Watching TV.  Working on the computer or phone.  Playing memory games and puzzles.  Rest. Rest helps your brain heal. Make sure you:  Get plenty of sleep. Most adults should get 7–9 hours of sleep each night.  Rest during the day. Take naps or rest breaks when you feel tired.  Avoid physical activity like exercise until your health care provider says it is safe. Stop any activity that worsens symptoms.  Do not do high-risk activities that could cause a second concussion, such as riding a bike or playing sports.  Ask your health care provider when you can return to your normal activities, such as school, work, athletics, and driving. Your ability to react may be slower after a brain injury. Never do these activities if you are dizzy. Your health care provider will likely give you a plan for gradually returning to activities.  General instructions    A bottle of beer, a glass of wine, and a glass of hard liquor with a "do not drink" sign over them.   Take over-the-counter and prescription medicines only as told by your health care provider. Some medicines, such as blood thinners (anticoagulants) and aspirin, may increase the risk for complications, such as bleeding.  Do not drink alcohol until your health care provider says you can.  Watch your symptoms and tell others around you to do the same. Complications sometimes occur after a concussion. Older adults with a brain injury may have a higher risk of serious complications.  Tell your , teachers, school nurse, school counselor, , or  about your injury, symptoms, and restrictions.  Keep all follow-up visits as told by your health care provider. This is important.  How is this prevented?  Avoiding another brain injury is very important. In rare cases, another injury can lead to permanent brain damage, brain swelling, or death. The risk of this is greatest during the first 7–10 days after a head injury. Avoid injuries by:  Stopping activities that could lead to a second concussion, such as contact or recreational sports, until your health care provider says it is okay.  Taking these actions once you have returned to sports or activities:  Avoiding plays or moves that can cause you to crash into another person. This is how most concussions occur.  Following the rules and being respectful of other players. Do not engage in violent or illegal plays.  Getting regular exercise that includes strength and balance training.  Wearing a properly fitting helmet during sports, biking, or other activities. Helmets can help protect you from serious skull and brain injuries, but they may not protect you from a concussion. Even when wearing a helmet, you should avoid being hit in the head.  Contact a health care provider if:  Your symptoms do not improve.  You have new symptoms.  You have another injury.  Get help right away if:  You have new or worsening physical symptoms, such as:  A severe or worsening headache.  Weakness or numbness in any part of your body, slurred speech, vision changes, or confusion.  Your coordination gets worse.  Vomiting repeatedly.  You have a seizure.  You have unusual behavior changes.  You lose consciousness, are sleepier than normal, or are difficult to wake up.  These symptoms may represent a serious problem that is an emergency. Do not wait to see if the symptoms will go away. Get medical help right away. Call your local emergency services (911 in the [place] Do not drive yourself to the hospital.    Summary  A concussion is a brain injury that results from a hard, direct hit (trauma) to your head or body.  You may have imaging tests and neuropsychological tests to diagnose a concussion.  Treatment for this condition includes physical and mental rest and careful observation.  Ask your health care provider when you can return to your normal activities, such as school, work, athletics, and driving.  Get help right away if you have a severe headache, weakness in any part of the body, seizures, behavior changes, changes in vision, or if you are confused or sleepier than normal. Concussion, Adult    A series of images showing how the quick head movements of a concussion injure the brain.  A concussion is a brain injury from a hard, direct hit (trauma) to the head or body. This direct hit causes the brain to shake quickly back and forth inside the skull. This can damage brain cells and cause chemical changes in the brain. A concussion may also be known as a mild traumatic brain injury (TBI).    Concussions are usually not life-threatening, but the effects of a concussion can be serious. If you have a concussion, you should be very careful to avoid having a second concussion.    The signs of a concussion can be hard to notice. Early on, they may be missed by you, family members, and health care providers. You may look fine on the outside but may act or feel differently.    Every head injury is different. Symptoms are usually temporary but may last for days, weeks, or even months. Some symptoms appear right away, but other symptoms may not show up for hours or days. If your symptoms last longer than normal, you may have post-concussion syndrome.    Physical symptoms    Headaches.  Dizziness and problems with coordination or balance.  Sensitivity to light or noise.  Nausea or vomiting.  Tiredness (fatigue).  Vision or hearing problems.  Changes in eating or sleeping patterns.  Seizure.  Mental and emotional symptoms    Irritability or mood changes.  Memory problems.  Trouble concentrating, organizing, or making decisions.  Slowness in thinking, acting or reacting, speaking, or reading.  Anxiety or depression.    Treatment for this condition includes:  Stopping sports or activity if you are injured. If you hit your head or show signs of concussion:  Do not return to sports or activities the same day.  Get checked by a health care provider before you return to your activities.  Physical and mental rest and careful observation, usually at home. Gradually return to your normal activities.  Medicines to help with symptoms such as headaches, nausea, or difficulty sleeping.  Avoid taking opioid pain medicine while recovering from a concussion.  Avoiding alcohol and drugs. These may slow your recovery and can put you at risk of further injury.  Referral to a concussion clinic or rehabilitation center.  Recovery from a concussion can take time. How fast you recover depends on many factors. Return to activities only when:  Your symptoms are completely gone.  Your health care provider says that it is safe.    Follow these instructions at home:    Activity  Limit activities that require a lot of thought or concentration, such as:  Doing homework or job-related work.  Watching TV.  Working on the computer or phone.  Playing memory games and puzzles.  Rest. Rest helps your brain heal. Make sure you:  Get plenty of sleep. Most adults should get 7–9 hours of sleep each night.  Rest during the day. Take naps or rest breaks when you feel tired.  Avoid physical activity like exercise until your health care provider says it is safe. Stop any activity that worsens symptoms.  Do not do high-risk activities that could cause a second concussion, such as riding a bike or playing sports.  Ask your health care provider when you can return to your normal activities, such as school, work, athletics, and driving. Your ability to react may be slower after a brain injury. Never do these activities if you are dizzy. Your health care provider will likely give you a plan for gradually returning to activities.    General instructions    A bottle of beer, a glass of wine, and a glass of hard liquor with a "do not drink" sign over them.   Take over-the-counter and prescription medicines only as told by your health care provider. Some medicines, such as blood thinners (anticoagulants) and aspirin, may increase the risk for complications, such as bleeding.  Do not drink alcohol until your health care provider says you can.  Watch your symptoms and tell others around you to do the same. Complications sometimes occur after a concussion. Older adults with a brain injury may have a higher risk of serious complications.  Tell your , teachers, school nurse, school counselor, , or  about your injury, symptoms, and restrictions.  Keep all follow-up visits as told by your health care provider. This is important.  How is this prevented?  Avoiding another brain injury is very important. In rare cases, another injury can lead to permanent brain damage, brain swelling, or death. The risk of this is greatest during the first 7–10 days after a head injury. Avoid injuries by:  Stopping activities that could lead to a second concussion, such as contact or recreational sports, until your health care provider says it is okay.  Taking these actions once you have returned to sports or activities:  Avoiding plays or moves that can cause you to crash into another person. This is how most concussions occur.  Following the rules and being respectful of other players. Do not engage in violent or illegal plays.  Getting regular exercise that includes strength and balance training.  Wearing a properly fitting helmet during sports, biking, or other activities. Helmets can help protect you from serious skull and brain injuries, but they may not protect you from a concussion. Even when wearing a helmet, you should avoid being hit in the head.  Contact a health care provider if:  Your symptoms do not improve.  You have new symptoms.  You have another injury.  Get help right away if:  You have new or worsening physical symptoms, such as:  A severe or worsening headache.  Weakness or numbness in any part of your body, slurred speech, vision changes, or confusion.  Your coordination gets worse.  Vomiting repeatedly.  You have a seizure.  You have unusual behavior changes.  You lose consciousness, are sleepier than normal, or are difficult to wake up.  These symptoms may represent a serious problem that is an emergency. Do not wait to see if the symptoms will go away. Get medical help right away. Call your local emergency services (911 in the [place] Do not drive yourself to the hospital.

## 2023-08-22 NOTE — ED ADULT TRIAGE NOTE - CHIEF COMPLAINT QUOTE
mechanical fall while hiking Sunday. +head strike, unknown LOC, denies a/c use. c/o intermittent headache and dizziness

## 2023-08-22 NOTE — ED PROVIDER NOTE - CLINICAL SUMMARY MEDICAL DECISION MAKING FREE TEXT BOX
History and physical exam findings of recent head trauma with LOC and associated symptoms of dizziness, headaches, nausea, changes in vision warrant further evaluation with CT head/maxillofacial to assess for ICH vs concussion. Given pain meds, will reassess.

## 2023-08-22 NOTE — ED PROVIDER NOTE - OBJECTIVE STATEMENT
42-year-old with no significant past medical history presents today status post mechanical fall while hiking on Sunday.  Patient states he was climbing a rock and was 2 feet up when he slipped and hit his left skull against a rock.  Patient admits to a brief LOC.  Endorses associated symptoms of headache, dizziness, nausea changes in vision that have progressively worsened over the past 2 days.  Denies fever, chills, vomiting, cervical tenderness, chest pain, SOB, abdominal pain.  Symptoms worsen after exertion.

## 2023-08-22 NOTE — ED PROVIDER NOTE - PATIENT PORTAL LINK FT
You can access the FollowMyHealth Patient Portal offered by [facility] by registering at the following website: [email or web address]. By joining [facility]’s FollowMyHealth portal, you will also be able to view your health information using other applications (apps) compatible with our system.

## 2023-08-22 NOTE — ED PROVIDER NOTE - PHYSICAL EXAMINATION
Gen: Middle age man w/ bandage along the forehead, in no acute distress   Head: normal appearing, left periorbital swelling w/ 2 abrasion along the left rosalba and left maxillary region, Normal EOM, Normal PERRLA.   HEENT: normal conjunctiva, oral mucosa moist  Lung: no respiratory distress, speaking in full sentences, CTA b/l     CV: regular rate and rhythm, no murmurs  Abd: soft, non distended, non tender   MSK: no visible deformities  Neuro: No focal deficits, AAOx3  Skin: Warm  Psych: normal affect

## 2023-08-22 NOTE — ED PROVIDER NOTE - ATTENDING CONTRIBUTION TO CARE
MD Bran:  patient seen and evaluated personally.   I agree with the History & Physical,  Impression & Plan other than what was detailed in my note.  MD Bran  41 y/o m s/p fall on sunday, was climbing a two foot rock, fell and hit head on a rock, pos loc, felt dazed when he woke up, felt nausea, fogginess, felt a little off balance, ha has been getting worse, no vomiting, swelling to left forehead, no neck pain, afebrile vitals stable  non toxic well appearing, NC let frontal head swelling/abrasion, some mild periorbital swelling,  no max face ttp,  conjunctiva non conjected, sclera anicteric PERRL, no proptosis moist mucous membranes, neck supple, no midline c/t/l/s spine ttp,  heart sounds, normal, no mrg, lungs cta b/l no wrr, no chest ttp,  abd soft non distended w/ no tenderness, pelvis stable, no visual deformities of extremities, from of all joints, no ttp, axox3, , normal mood and affect, plan for ct head r/o ich, max face r/o orbital/facial frx, no c spine ttp, pt not on blood thinners, will update tdap.

## 2023-08-23 VITALS
OXYGEN SATURATION: 98 % | DIASTOLIC BLOOD PRESSURE: 83 MMHG | SYSTOLIC BLOOD PRESSURE: 148 MMHG | TEMPERATURE: 98 F | RESPIRATION RATE: 17 BRPM | HEART RATE: 71 BPM

## 2023-08-23 PROCEDURE — 90471 IMMUNIZATION ADMIN: CPT

## 2023-08-23 PROCEDURE — 99284 EMERGENCY DEPT VISIT MOD MDM: CPT | Mod: 25

## 2023-08-23 PROCEDURE — 76377 3D RENDER W/INTRP POSTPROCES: CPT

## 2023-08-23 PROCEDURE — 70486 CT MAXILLOFACIAL W/O DYE: CPT | Mod: 26,MA

## 2023-08-23 PROCEDURE — 70486 CT MAXILLOFACIAL W/O DYE: CPT | Mod: MA

## 2023-08-23 PROCEDURE — 70450 CT HEAD/BRAIN W/O DYE: CPT | Mod: MA

## 2023-08-23 PROCEDURE — 76377 3D RENDER W/INTRP POSTPROCES: CPT | Mod: 26

## 2023-08-23 PROCEDURE — 90715 TDAP VACCINE 7 YRS/> IM: CPT

## 2023-08-23 PROCEDURE — 70450 CT HEAD/BRAIN W/O DYE: CPT | Mod: 26,MA

## 2023-08-23 RX ORDER — TETANUS TOXOID, REDUCED DIPHTHERIA TOXOID AND ACELLULAR PERTUSSIS VACCINE, ADSORBED 5; 2.5; 8; 8; 2.5 [IU]/.5ML; [IU]/.5ML; UG/.5ML; UG/.5ML; UG/.5ML
0.5 SUSPENSION INTRAMUSCULAR ONCE
Refills: 0 | Status: COMPLETED | OUTPATIENT
Start: 2023-08-23 | End: 2023-08-23

## 2023-08-23 RX ORDER — ACETAMINOPHEN 500 MG
975 TABLET ORAL ONCE
Refills: 0 | Status: COMPLETED | OUTPATIENT
Start: 2023-08-23 | End: 2023-08-23

## 2023-08-23 RX ADMIN — Medication 975 MILLIGRAM(S): at 00:21

## 2023-08-23 RX ADMIN — TETANUS TOXOID, REDUCED DIPHTHERIA TOXOID AND ACELLULAR PERTUSSIS VACCINE, ADSORBED 0.5 MILLILITER(S): 5; 2.5; 8; 8; 2.5 SUSPENSION INTRAMUSCULAR at 02:08

## 2023-08-23 NOTE — ED ADULT NURSE NOTE - NSFALLRISKINTERV_ED_ALL_ED

## 2023-08-23 NOTE — ED ADULT NURSE NOTE - OBJECTIVE STATEMENT
41 y/o male c/o mechanical fall. States he was hiking on Sunday, tripped on the trail, he landed on his hands and left side of face. Endorsing intermittent headache, dizziness and some blurry vision occasionally. States he wanted to come to ED for concussion eval. Upon assessment patient a&ox4, ambulatory, breathing unlabored and spontaneous on RA, abdomen soft and nontender, skin warm and dry, abrasion to left forehead and cheek. VSS. Given tylenol as per MD order. Safety measures maintained.

## 2023-09-19 ENCOUNTER — NON-APPOINTMENT (OUTPATIENT)
Age: 43
End: 2023-09-19

## 2023-10-31 ENCOUNTER — APPOINTMENT (OUTPATIENT)
Dept: INTERNAL MEDICINE | Facility: CLINIC | Age: 43
End: 2023-10-31
Payer: MEDICAID

## 2023-10-31 ENCOUNTER — NON-APPOINTMENT (OUTPATIENT)
Age: 43
End: 2023-10-31

## 2023-10-31 VITALS
OXYGEN SATURATION: 98 % | BODY MASS INDEX: 30.77 KG/M2 | WEIGHT: 203 LBS | SYSTOLIC BLOOD PRESSURE: 132 MMHG | TEMPERATURE: 98.2 F | HEIGHT: 68 IN | DIASTOLIC BLOOD PRESSURE: 83 MMHG | HEART RATE: 68 BPM

## 2023-10-31 DIAGNOSIS — B00.1 HERPESVIRAL VESICULAR DERMATITIS: ICD-10-CM

## 2023-10-31 DIAGNOSIS — Z00.00 ENCOUNTER FOR GENERAL ADULT MEDICAL EXAMINATION W/OUT ABNORMAL FINDINGS: ICD-10-CM

## 2023-10-31 DIAGNOSIS — N52.9 MALE ERECTILE DYSFUNCTION, UNSPECIFIED: ICD-10-CM

## 2023-10-31 DIAGNOSIS — F41.9 ANXIETY DISORDER, UNSPECIFIED: ICD-10-CM

## 2023-10-31 PROCEDURE — 99213 OFFICE O/P EST LOW 20 MIN: CPT | Mod: 25

## 2023-10-31 PROCEDURE — 99396 PREV VISIT EST AGE 40-64: CPT | Mod: 25

## 2023-10-31 RX ORDER — ALPRAZOLAM 0.5 MG/1
0.5 TABLET ORAL
Qty: 20 | Refills: 0 | Status: ACTIVE | COMMUNITY
Start: 2022-06-07 | End: 1900-01-01

## 2023-10-31 RX ORDER — TRAMADOL HYDROCHLORIDE 50 MG/1
50 TABLET, COATED ORAL
Qty: 28 | Refills: 0 | Status: DISCONTINUED | COMMUNITY
Start: 2023-01-24 | End: 2023-10-31

## 2023-10-31 RX ORDER — TRAMADOL HYDROCHLORIDE 50 MG/1
50 TABLET, COATED ORAL
Qty: 30 | Refills: 0 | Status: DISCONTINUED | COMMUNITY
Start: 2023-01-03 | End: 2023-10-31

## 2023-10-31 RX ORDER — PANTOPRAZOLE 40 MG/1
40 TABLET, DELAYED RELEASE ORAL DAILY
Qty: 30 | Refills: 0 | Status: DISCONTINUED | COMMUNITY
Start: 2023-01-03 | End: 2023-10-31

## 2023-10-31 RX ORDER — OXYCODONE 5 MG/1
5 TABLET ORAL
Qty: 40 | Refills: 0 | Status: DISCONTINUED | COMMUNITY
Start: 2023-01-03 | End: 2023-10-31

## 2023-10-31 RX ORDER — VALACYCLOVIR 1 G/1
1 TABLET, FILM COATED ORAL
Qty: 8 | Refills: 0 | Status: ACTIVE | COMMUNITY
Start: 2023-10-31 | End: 1900-01-01

## 2023-10-31 RX ORDER — ASPIRIN 81 MG/1
81 TABLET ORAL
Qty: 60 | Refills: 0 | Status: DISCONTINUED | COMMUNITY
Start: 2023-01-03 | End: 2023-10-31

## 2023-10-31 RX ORDER — TADALAFIL 10 MG/1
10 TABLET, FILM COATED ORAL
Qty: 10 | Refills: 3 | Status: ACTIVE | COMMUNITY
Start: 2023-10-31 | End: 1900-01-01

## 2023-10-31 RX ORDER — NAPROXEN 500 MG/1
500 TABLET ORAL
Qty: 40 | Refills: 1 | Status: DISCONTINUED | COMMUNITY
Start: 2023-01-03 | End: 2023-10-31

## 2023-10-31 RX ORDER — NAPROXEN 500 MG/1
500 TABLET ORAL
Qty: 40 | Refills: 1 | Status: DISCONTINUED | COMMUNITY
Start: 2023-01-24 | End: 2023-10-31

## 2023-11-01 PROBLEM — F41.9 ANXIETY: Status: ACTIVE | Noted: 2022-06-07

## 2023-11-07 LAB
25(OH)D3 SERPL-MCNC: 19.7 NG/ML
ALBUMIN SERPL ELPH-MCNC: 4.8 G/DL
ALP BLD-CCNC: 78 U/L
ALT SERPL-CCNC: 42 U/L
ANION GAP SERPL CALC-SCNC: 10 MMOL/L
APPEARANCE: CLEAR
AST SERPL-CCNC: 40 U/L
BASOPHILS # BLD AUTO: 0.06 K/UL
BASOPHILS NFR BLD AUTO: 0.8 %
BILIRUB SERPL-MCNC: 1 MG/DL
BILIRUBIN URINE: NEGATIVE
BLOOD URINE: NEGATIVE
BUN SERPL-MCNC: 7 MG/DL
CALCIUM SERPL-MCNC: 9.3 MG/DL
CHLORIDE SERPL-SCNC: 100 MMOL/L
CHOLEST SERPL-MCNC: 193 MG/DL
CO2 SERPL-SCNC: 28 MMOL/L
COLOR: YELLOW
CREAT SERPL-MCNC: 0.89 MG/DL
EGFR: 108 ML/MIN/1.73M2
EOSINOPHIL # BLD AUTO: 0.21 K/UL
EOSINOPHIL NFR BLD AUTO: 3 %
ESTIMATED AVERAGE GLUCOSE: 108 MG/DL
FSH SERPL-MCNC: <0.3 IU/L
GLUCOSE QUALITATIVE U: NEGATIVE MG/DL
GLUCOSE SERPL-MCNC: 108 MG/DL
HBA1C MFR BLD HPLC: 5.4 %
HCT VFR BLD CALC: 48.8 %
HDLC SERPL-MCNC: 69 MG/DL
HGB BLD-MCNC: 15.7 G/DL
IMM GRANULOCYTES NFR BLD AUTO: 0.4 %
KETONES URINE: NEGATIVE MG/DL
LDLC SERPL CALC-MCNC: 116 MG/DL
LEUKOCYTE ESTERASE URINE: NEGATIVE
LH SERPL-ACNC: <0.3 IU/L
LYMPHOCYTES # BLD AUTO: 1.69 K/UL
LYMPHOCYTES NFR BLD AUTO: 23.9 %
MAN DIFF?: NORMAL
MCHC RBC-ENTMCNC: 30.1 PG
MCHC RBC-ENTMCNC: 32.2 GM/DL
MCV RBC AUTO: 93.5 FL
MONOCYTES # BLD AUTO: 0.61 K/UL
MONOCYTES NFR BLD AUTO: 8.6 %
NEUTROPHILS # BLD AUTO: 4.48 K/UL
NEUTROPHILS NFR BLD AUTO: 63.3 %
NITRITE URINE: NEGATIVE
NONHDLC SERPL-MCNC: 124 MG/DL
PH URINE: 7
PLATELET # BLD AUTO: 323 K/UL
POTASSIUM SERPL-SCNC: 4.3 MMOL/L
PROT SERPL-MCNC: 7.6 G/DL
PROTEIN URINE: NEGATIVE MG/DL
PSA SERPL-MCNC: 0.43 NG/ML
RBC # BLD: 5.22 M/UL
RBC # FLD: 13 %
SODIUM SERPL-SCNC: 138 MMOL/L
SPECIFIC GRAVITY URINE: 1.02
TESTOST FREE SERPL-MCNC: 35.1 PG/ML
TESTOST SERPL-MCNC: >1500 NG/DL
TRIGL SERPL-MCNC: 44 MG/DL
TSH SERPL-ACNC: 1.3 UIU/ML
UROBILINOGEN URINE: 1 MG/DL
VIT B12 SERPL-MCNC: 378 PG/ML
WBC # FLD AUTO: 7.08 K/UL

## 2024-02-20 ENCOUNTER — APPOINTMENT (OUTPATIENT)
Dept: HUMAN REPRODUCTION | Facility: CLINIC | Age: 44
End: 2024-02-20

## 2024-03-06 ENCOUNTER — APPOINTMENT (OUTPATIENT)
Dept: INTERNAL MEDICINE | Facility: CLINIC | Age: 44
End: 2024-03-06
Payer: MEDICAID

## 2024-03-06 VITALS
SYSTOLIC BLOOD PRESSURE: 149 MMHG | HEART RATE: 80 BPM | DIASTOLIC BLOOD PRESSURE: 83 MMHG | TEMPERATURE: 98.7 F | WEIGHT: 201 LBS | OXYGEN SATURATION: 99 % | HEIGHT: 68 IN | BODY MASS INDEX: 30.46 KG/M2

## 2024-03-06 DIAGNOSIS — R31.9 HEMATURIA, UNSPECIFIED: ICD-10-CM

## 2024-03-06 DIAGNOSIS — R73.09 OTHER ABNORMAL GLUCOSE: ICD-10-CM

## 2024-03-06 DIAGNOSIS — E55.9 VITAMIN D DEFICIENCY, UNSPECIFIED: ICD-10-CM

## 2024-03-06 DIAGNOSIS — K52.9 NONINFECTIVE GASTROENTERITIS AND COLITIS, UNSPECIFIED: ICD-10-CM

## 2024-03-06 PROCEDURE — 99215 OFFICE O/P EST HI 40 MIN: CPT

## 2024-03-06 NOTE — PHYSICAL EXAM
[No Respiratory Distress] : no respiratory distress  [Normal Sclera/Conjunctiva] : normal sclera/conjunctiva [Normal] : soft, non-tender, non-distended, no masses palpated, no HSM and normal bowel sounds

## 2024-03-06 NOTE — HISTORY OF PRESENT ILLNESS
[FreeTextEntry8] : 43 M presenting with fevers, diarrhea, weight loss, dizziness, abdominal bloating sensation and lack of appetite since Sunday. Febrile up to 102 F. Denies nausea or vomiting. He is also requesting review of prior blood tests today and a referral to see Urologist given he had rbcs in the urine in the past. Prior urine analysis did not show blood.

## 2024-03-06 NOTE — PLAN
[FreeTextEntry1] : #Gastroenteritis - BRAT diet advised, recommend aggressive PO hydration, advised gastroenteritis is self-limiting. - COVID/RSV/Flu testing done today given association between covid and GI symptoms   #Hyperglycemia - Advised on importance of strict carbohydrate control as well as increasing physical activity   #Vitamin D deficiency - Recommend [id number] IU units per day of vitamin D3  #RBCs in urine - Most recent urine analysis without blood, Urology referral provided as per patient request.

## 2024-03-07 LAB
INFLUENZA A RESULT: NOT DETECTED
INFLUENZA B RESULT: NOT DETECTED
RESP SYN VIRUS RESULT: NOT DETECTED
SARS-COV-2 RESULT: NOT DETECTED

## 2024-07-02 ENCOUNTER — EMERGENCY (EMERGENCY)
Facility: HOSPITAL | Age: 44
LOS: 1 days | End: 2024-07-02
Admitting: EMERGENCY MEDICINE
Payer: MEDICAID

## 2024-07-02 VITALS
HEIGHT: 69 IN | OXYGEN SATURATION: 100 % | HEART RATE: 83 BPM | DIASTOLIC BLOOD PRESSURE: 80 MMHG | TEMPERATURE: 98 F | SYSTOLIC BLOOD PRESSURE: 127 MMHG | RESPIRATION RATE: 20 BRPM | WEIGHT: 194.01 LBS

## 2024-07-02 DIAGNOSIS — Z98.89 OTHER SPECIFIED POSTPROCEDURAL STATES: Chronic | ICD-10-CM

## 2024-07-02 DIAGNOSIS — Z90.3 ACQUIRED ABSENCE OF STOMACH [PART OF]: Chronic | ICD-10-CM

## 2024-07-02 PROCEDURE — L9991: CPT

## 2024-08-26 ENCOUNTER — NON-APPOINTMENT (OUTPATIENT)
Age: 44
End: 2024-08-26

## 2024-11-15 NOTE — ED ADULT NURSE NOTE - SCORE
Continued Stay SW/CM Assessment/Plan of Care Note       Active Substitute Decision Maker (SDM)    There are no active Substitute Decision Maker (SDM) on file.       Progress note:  Medical stabilization is ongoing.  PT/OT is following.  Pt remains on pain medications.  SW /CM will continue to follow for DC planning.     See SW/CM flowsheets for other objective data.    Disposition Recommendations:  SW/CM recommendation for discharge: Home    Discharge Plan/Needs:     Continued Care and Services - Admitted Since 11/12/2024    No active coordination exists for this encounter.       Continued Care and Services - Linked Episodes Includes continued care and service providers from the active episodes linked to this encounter, which are listed below      Care Transitions Episode start date: 11/12/2024   There are no active outsourced providers for this episode.                 Prior To Hospitalization:    Living Situation: Spouse and residing at House   Support Systems: None, Spouse   Home Devices/Equipment: Mobility assist device   Mobility Assist Devices: None   Type of Service Prior to Hospitalization: None     Patient/Family discharge goal (s):  Home     Prior Function  Bed Mobility: Independent (11/14/24 1050 : Juanita Kang, PT)  Transfers: Independent (11/14/24 1050 : Juanita Kang, PT)  Ambulation in the Home: Independent (11/14/24 1050 : Juanita Kang, PT)  Ambulation in the Community: Independent (11/14/24 1050 : Juanita Kang, PT)    Current Function  Last Filed Values         Value Time User    Current Function  slightly below baseline level of function 11/14/2024  1:34 PM Juanita Kang, PT    Therapy Impairments  activity tolerance; pain 11/14/2024  1:34 PM Juanita Kang, PT    ADLs Requiring Support  bed mobility; transfers; ambulation; stairs 11/14/2024  1:34 PM Juanita Kang, PT          Therapy Recommendations for Discharge:   PT:      Last Filed Values         Value Time User    PT Discharge Needs  does  not require ongoing therapy 11/14/2024  1:34 PM Juanita Kang, PT          OT:       Last Filed Values         Value Time User    OT Discharge Needs  therapy 1-3 times per week 11/14/2024  1:33 PM Lakia Gonsalez, OTR/L          SLP:    Last Filed Values       None          Mobility Equipment Recommended for Discharge: pt owns RW      Barriers to Discharge  Identified Barriers to Discharge/Transition Planning: Medical necessity for acute care                1

## 2024-12-16 ENCOUNTER — NON-APPOINTMENT (OUTPATIENT)
Age: 44
End: 2024-12-16

## 2025-01-31 ENCOUNTER — NON-APPOINTMENT (OUTPATIENT)
Age: 45
End: 2025-01-31

## 2025-06-02 NOTE — PATIENT PROFILE ADULT. - BILL OF RIGHTS/ADMISSION INFORMATION PROVIDED TO:
Occupational Therapy: Daily Treatment Note    Patient: Halima Chan (58 y.o. female)   Examination Date:    Plan of Care Certification Period:  to Certification Period Expiration Date: 25   :  1966 MRN: [id number]  CSN: [id number]   Insurance: Payor: [employer] / Plan: BC -  [place] / Product Type: *No Product type* /   Insurance ID: [id number] - ([facility]) Secondary Insurance (if applicable):    Referring Physician: Keiko Butler APRN - RADHA Butler   PCP: Trice Sherman MD Visits to Date/Visits Approved: 3 /       Medical Diagnosis: Mixed incontinence [N39.46]    Treatment Diagnosis: Treatment Diagnosis: decreased muscle strength, endurance and coordination    Prognosis: Good     Treatment Activities     Exercises:   OT Exercise 1: Supine legs up wall x 3 minutes  OT Exercise 2: yoga stretch; tolerated x 1 minute due to back strain over the weekend racing horses and building fences  OT Exercise 3: praying stretch on stool x 2 minutes  OT Exercise 4: Supine transverse abdominis contraction alone; able to initiate contraction with decreased time and sustain duration of 10 seconds; able to breathe with cues  OT Exercise 5: Supine transverse abdominis contraction alternate UE 1 x 10; completes 10 consecutive reps  OT Exercise 6: Supine transverse abdominis contraction alternate LE 1 x 10; completes 10 consecutive reps  OT Exercise 7: Supine transverse abdominis contraction alternate UE/LE 1 x 10; completes 10 consecutive reps  OT Exercise 8: Supine transverse abdominis contraction with bridge 1 x 10; completes 10 consecutive reps  OT Exercise 9: Sit to stand with ta contraction and exhale when initiating standing up 1 x 10; demonstrates a good understanding  OT Exercise 10: Downtraining with biofeedback; pt able to relax down to 2/3 prior to pfm exercises and 3/4 following  OT Exercise 11: Supine pfm contraction x 10 seconds on and 10 seconds off in supine hooklying alone WITHOUT WEDGE--UPGRADE; 
Patient

## (undated) DEVICE — STRYKER INTERPULSE HANDPIECE W IRR SUCTION TUBE

## (undated) DEVICE — BLADE SCALPEL SAFETYLOCK #20

## (undated) DEVICE — HOOD FLYTE STRYKER SURGICOOL W PEELAWAY

## (undated) DEVICE — SOL BETADINE POUCH 0.75OZ STERILE

## (undated) DEVICE — SAW BLADE STRYKER DUAL CUT 1.27X11 X90MM

## (undated) DEVICE — DRAPE SUPINE ESYSUIT

## (undated) DEVICE — GLV 8.5 PROTEXIS (BLUE)

## (undated) DEVICE — DRSG DERMABOND PRINEO 22CM

## (undated) DEVICE — SUT VICRYL 1 27" CPX UNDYED

## (undated) DEVICE — SOL IRR POUR NS 0.9% 500ML

## (undated) DEVICE — GLV 8 PROTEXIS (CREAM) NEU-THERA

## (undated) DEVICE — ELCTR BOVIE PENCIL SMOKE EVACUATION

## (undated) DEVICE — SUT PDO 2 1/2 CIRCLE 40MM NDL 45CM

## (undated) DEVICE — DRSG AQUACEL 3.5 X 10"

## (undated) DEVICE — DRSG DERMABOND 0.7ML

## (undated) DEVICE — HOOD FLYTE STRYKER HELMET SHIELD

## (undated) DEVICE — SUT STRATAFIX SYMMETRIC PDS PLUS 1 18" CTX VIOLET

## (undated) DEVICE — VENODYNE/SCD SLEEVE CALF LARGE

## (undated) DEVICE — SUT QUILL MONODERM 0 1/2 CIRCLE TAPR 45CM 26MM

## (undated) DEVICE — WARMING BLANKET UPPER ADULT

## (undated) DEVICE — DRAPE 3/4 SHEET W REINFORCEMENT 56X77"

## (undated) DEVICE — GLV 7 PROTEXIS (WHITE)

## (undated) DEVICE — DRAPE 1/2 SHEET 40X57"

## (undated) DEVICE — SOL IRR POUR H2O 1000ML

## (undated) DEVICE — SUT MONOCRYL 2-0 27" SH UNDYED

## (undated) DEVICE — DRAPE U 47X51" LF STERILE

## (undated) DEVICE — GLV 8.5 PROTEXIS (WHITE)

## (undated) DEVICE — GLV 6.5 PROTEXIS (WHITE)

## (undated) DEVICE — GLV 7.5 PROTEXIS (WHITE)

## (undated) DEVICE — SUT QUILL MONODERM 2-0 3/8 CIRCLE 45CM

## (undated) DEVICE — LIGHT PIPE

## (undated) DEVICE — SPECIMEN CONTAINER 100ML

## (undated) DEVICE — TUBING TUR 2 PRONG